# Patient Record
Sex: MALE | Race: WHITE | Employment: FULL TIME | URBAN - METROPOLITAN AREA
[De-identification: names, ages, dates, MRNs, and addresses within clinical notes are randomized per-mention and may not be internally consistent; named-entity substitution may affect disease eponyms.]

---

## 2017-05-16 ENCOUNTER — HOSPITAL ENCOUNTER (OUTPATIENT)
Dept: LAB | Age: 72
Discharge: HOME | End: 2017-05-16

## 2017-05-16 ENCOUNTER — OFFICE VISIT (OUTPATIENT)
Dept: DERMATOLOGY | Facility: AMBULATORY SURGERY CENTER | Age: 72
End: 2017-05-16

## 2017-05-16 VITALS
BODY MASS INDEX: 26.48 KG/M2 | TEMPERATURE: 98.2 F | SYSTOLIC BLOOD PRESSURE: 114 MMHG | RESPIRATION RATE: 18 BRPM | DIASTOLIC BLOOD PRESSURE: 70 MMHG | HEART RATE: 65 BPM | WEIGHT: 185 LBS | HEIGHT: 70 IN | OXYGEN SATURATION: 97 %

## 2017-05-16 DIAGNOSIS — Z85.828 HISTORY OF NONMELANOMA SKIN CANCER: ICD-10-CM

## 2017-05-16 DIAGNOSIS — D48.5 NEOPLASM OF UNCERTAIN BEHAVIOR OF SCALP: Primary | ICD-10-CM

## 2017-05-16 DIAGNOSIS — D22.9 MULTIPLE BENIGN NEVI: ICD-10-CM

## 2017-05-16 DIAGNOSIS — L90.5 SCAR CONDITION AND FIBROSIS OF SKIN: ICD-10-CM

## 2017-05-16 DIAGNOSIS — L82.1 SEBORRHEIC KERATOSES: ICD-10-CM

## 2017-05-16 DIAGNOSIS — D18.01 CHERRY ANGIOMA: ICD-10-CM

## 2017-05-16 DIAGNOSIS — L82.0 INFLAMED SEBORRHEIC KERATOSIS: ICD-10-CM

## 2017-05-16 DIAGNOSIS — L72.9 CYST OF SKIN: ICD-10-CM

## 2017-05-16 RX ORDER — SIMVASTATIN 40 MG/1
TABLET, FILM COATED ORAL
COMMUNITY
Start: 2017-04-16

## 2017-05-16 NOTE — PROGRESS NOTES
Mr. Kaylee Morales comes in for follow-up. He is 27-year-old male with a history of nonmelanoma skin cancer. He has noticed a rough spot on his scalp and a bump on his right collar bone. He is feeling well and in his usual state of health today. He has no pain, no current illnesses, no other skin concerns. His allergies medications medical and social history are reviewed by me today. I have reviewed the history, physical exam, assessment and plan by Lala Rosales NP and agree. He is awake alert gentleman who appears well. I examined his scalp face neck and upper chest. He has an inflamed seborrheic keratosis on the vertex scalp, anterior to this is a probably palpable concerning for new basal cell carcinoma. He has an inflamed seborrheic keratosis left neck and a 3 mm yellowish papule consistent with epidermal cyst on the right clavicle. He has a well-healed scar right nasal tip with sebaceous hyperplasia at the distal end, no evidence of recurrent skin cancer. Biopsy was performed on the scalp to confirm basal cell carcinoma, treatment would be with Mohs surgery due to location if diagnosis confirmed. Irritated seborrheic keratosis was treated with cryotherapy. Cyst can be treated with excision. Well-healed scar and sebaceous hyperplasia on the nose to be observed with his routine exams.

## 2017-05-16 NOTE — PROGRESS NOTES
Name: Rj Pryor       Age: 67 y.o. Date: 5/16/2017    Chief Complaint:   Chief Complaint   Patient presents with    Skin Exam       Subjective:    HPI  Mr. Rj Pryor is a 67 y.o. male who presents for a full skin exam.  The patient's last skin exam was on 10/11/2016 and the patient does have current complaints related to his skin. He is aware of a lesion on the scalp. He noticed a lesion/ bump on the scalp, right collar bone and left neck. Mr. Rj Pryor is feeling well and in his usual state of health today. The patient's pertinent skin history includes : multiple NMSC with largest -BCC of the right nasal sidewall and history of AK    ROS: Constitutional: Negative. Dermatological : positive for - skin lesion changes      Social History     Social History    Marital status:      Spouse name: N/A    Number of children: N/A    Years of education: N/A     Occupational History    Not on file. Social History Main Topics    Smoking status: Former Smoker    Smokeless tobacco: Not on file    Alcohol use Yes      Comment: \"socially\"    Drug use: Not on file    Sexual activity: Not on file     Other Topics Concern    Not on file     Social History Narrative       History reviewed. No pertinent family history. Past Medical History:   Diagnosis Date    Seizure disorder ([facility] [address]     Skin cancer 9/17/2012    basal cell carcinoma-right nose       Past Surgical History:   Procedure Laterality Date    HX OTHER SURGICAL      shoulder surgery       Current Outpatient Prescriptions   Medication Sig Dispense Refill    rosuvastatin (CRESTOR) 10 mg tablet Take 10 mg by mouth nightly.  carBAMazepine (TEGRETOL) 100 mg chewable tablet Take 100 mg by mouth four (4) times daily.         simvastatin (ZOCOR) 40 mg tablet       bupivacaine-EPINEPHrine (MARCAINE-EPINEPHRINE) 0.25 %-1:200,000 Soln For surgical procedure today only 3 mL 0       No Known Allergies      Objective:    Visit Vitals    /70 (BP 1 Location: Left arm, BP Patient Position: Sitting)    Pulse 65    Temp 98.2 °F (36.8 °C) (Oral)    Resp 18    Ht 5' 10\" (1.778 m)    Wt 185 lb (83.9 kg)    SpO2 97%    BMI 26.54 kg/m2       Bonnie Hughes is a 67 y.o. male who appears well and in no distress. He is awake, alert, and oriented. There is no preauricular, submandibular, or cervical lymphadenopathy. A skin examination was performed including his scalp, face (including eyelids), ears, neck, chest, back, abdomen, upper extremities (including digits/nails), lower extremities, breasts, buttocks; genital skin was not examined. There is a 7 x 6 mm pearly pink papule on the vertex scalp with telangiectasias, consistent with BCC. There is an inflamed seborrheic keratosis on the posterior vertex scalp and left neck. There is a 3 mm yellow mobile, non inflamed cystic structure at the head of the clavicle. There are multiple well-healed scars on the face and trunk without evidence of lesion recurrence. There scattered waxy macules and keratotic papules consistent with seborrheic keratoses, noninflamed. There are red and violaceous papules consistent with cherry angiomas. He has medium brown junctional nevi and pink intradermal nevi without concerning features. There are thickened toenails consistent with onychomycosis. Assessment/Plan:    1. Neoplasm of Uncertain Behavior, vertex scalp - r/o BCC. The differential diagnoses were discussed. A shave biopsy was advised to sample this lesion. The procedure was reviewed and verbal and written consent were obtained. The risks of pain, bleeding, infection, and scar were discussed. The patient is aware that this is a sample and is intended for diagnosis and not therapy of the skin lesion. I performed the procedure. The site was cleansed and anesthetized with 1% Lidocaine with Epinephrine 1:100,000.   A shave biopsy was performed to sample the lesion. Drysol was used for hemostasis. The wound was bandaged and care reviewed. The specimen was sent to pathology. I will contact the patient with the results and any further treatment that may be necessary. [facility]   OFFICE PROCEDURE PROGRESS NOTE   Chart reviewed for the following:   Boo MENDEZ, have reviewed the History, Physical and updated the Allergic reactions for [address] performed immediately prior to start of procedure:   Boo MENDEZ, have performed the following reviews on Elizabeth Mayo   prior to the start of the procedure:     * Patient was identified by name and date of birth   * Agreement on procedure being performed was verified   * Risks and Benefits explained to the patient   * Procedure site verified and marked as necessary   * Patient was positioned for comfort   * Consent was signed and verified     Time: [id number]  Date of procedure: 5/16/2017  Procedure performed by: Blaise Potter. Noris Lepe  Provider assisted by: lpn   Patient assisted by: self   How tolerated by patient: tolerated the procedure well with no complications   Comments: none    2. Inflamed seborrheic keratoses. The diagnosis and treatment with liquid nitrogen cryotherapy were reviewed. The risk or persistence or recurrence of the keratosis and the potential for pigment change at the treated site were reviewed. Verbal consent was obtained. I treated 2 lesions with cryotherapy and care was reviewed. 3. Seborrheic keratoses. The diagnosis was reviewed and the patient was reassured that no treatment is needed for these benign lesions. 4. Cherry angiomas. The diagnosis was reviewed and the patient was reassured that no treatment is needed for these benign lesions. 5.Normal nevi. The diagnosis of normal nevi was reviewed.   I discussed sun protection, sunscreen use, the warning signs of skin cancer, the need for self-skin examinations, and the need for regular practitioner exams every 6 months. The patient should follow up sooner as needed if new, changing, or symptomatic skin lesions arise. 6. Cyst of skin right clavicle. Excision with mohs appt as desired. 7. Personal history of skin cancer. I discussed sun protection, sunscreen use, the warning signs of skin cancer, the need for self-skin examinations, and the need for regular practitioner exams every 6 months. The patient should follow up sooner as needed if new, changing, or symptomatic skin lesions arise.

## 2017-05-16 NOTE — MR AVS SNAPSHOT
Visit Information Date & Time Provider Department Dept. Phone Encounter #  
 5/16/2017  8:30 AM Jefferygema Freddy, JUSTIN Deya 8057 [phone] [id number] Upcoming Health Maintenance Date Due DTaP/Tdap/Td series (1 - Tdap) 2/16/1965 FOBT Q 1 YEAR AGE 50-75 2/16/1994 ZOSTER VACCINE AGE 60> 2/16/2004 GLAUCOMA SCREENING Q2Y 2/16/2009 Pneumococcal 65+ Low/Medium Risk (1 of 2 - PCV13) 2/16/2009 MEDICARE YEARLY EXAM 2/16/2009 INFLUENZA AGE 9 TO ADULT 8/1/2017 Allergies as of 5/16/2017  Review Complete On: 5/16/2017 By: Tressa Abdul No Known Allergies Current Immunizations  Never Reviewed No immunizations on file. Not reviewed this visit You Were Diagnosed With   
  
 Codes Comments Neoplasm of uncertain behavior of scalp    -  Primary ICD-10-CM: D48.5 ICD-9-CM: 238.2 Vitals BP Pulse Temp Resp Height(growth percentile) Weight(growth percentile) 114/70 (BP 1 Location: Left arm, BP Patient Position: Sitting) 65 98.2 °F (36.8 °C) (Oral) 18 5' 10\" (1.778 m) 185 lb (83.9 kg) SpO2 BMI Smoking Status 97% 26.54 kg/m2 Former Smoker Vitals History BMI and BSA Data Body Mass Index Body Surface Area  
 26.54 kg/m 2 2.04 m 2 Preferred Pharmacy Pharmacy Name Phone [id number] [address] [phone] Your Updated Medication List  
  
   
This list is accurate as of: 5/16/17  8:59 AM.  Always use your most recent med list.  
  
  
  
  
 bupivacaine-EPINEPHrine 0.25 %-1:200,000 Soln Commonly known as:  Johnnye Candy For surgical procedure today only CRESTOR 10 mg tablet Generic drug:  rosuvastatin Take 10 mg by mouth nightly. simvastatin 40 mg tablet Commonly known as:  ZOCOR  
  
 TEGretol 100 mg chewable tablet Generic drug:  carBAMazepine Take 100 mg by mouth four (4) times daily. Patient Instructions Self Skin Exam and Sunscreens Early detection and treatment is essential in the treatment of all forms of skin cancer. If caught early, all forms of skin cancer are curable. In addition to your regular visits, you should perform a monthly skin examination. Over time, you become familiar with what is normally found on your skin and can identify new or suspicious spots. One of the screening tools you can use to assess your skin is to follow the ABCDEs: 
 
A= Asymmetry (One half is unlike the other half) B= Border (An irregular, scalloped or poorly defined edge) C= Color (Is varied from one area to another, has shades of tan, brown/ black,       white, red or blue) D= Diameter (Spots larger than 6mm or a pencil eraser) E= Evolving (New spots or one that is changing in size, shape, or color) A follow- up interval will be customized based on your history of skin cancer or level of skin damage and risk factors. In any case, if you notice a suspicious or new spot, an appointment should be arranged between regular visits. Everyone should use sunscreen and sun-safe practices, which is especially important for those with a personal or family history of skin cancer. Suggestions for this include: 1. Use daily moisturizers containing SPF 30 or higher. 2. Wear long sleeve clothing with UPF ratings and a broad-brimmed hat. 3. Apply sunscreen with SPF 30 or higher to all sun exposed areas if you are going to be in the sun. A broad spectrum UVA/ UVB sunscreen is best.  Dont forget to REAPPLY every two hours or more often if swimming or sweating! 4. Avoid outside activities during peak sun hours, especially in the summer (10am- 2pm). 5. DO NOT use tanning beds. Using sunscreen and sun-safe practices can help reduce the likelihood of developing skin cancer or additional skin cancers in those previously diagnosed. Introducing [address]! Radha Cleverly introduces [employer] patient portal. Now you can access parts of your medical record, email your doctor's office, and request medication refills online. 1. In your internet browser, go to [email or web address] 2. Click on the First Time User? Click Here link in the Sign In box. You will see the New Member Sign Up page. 3. Enter your [employer] Access Code exactly as it appears below. You will not need to use this code after youve completed the sign-up process. If you do not sign up before the expiration date, you must request a new code. · [employer] Access Code: [email or web address] Expires: 7/16/2017  9:41 AM 
 
4. Enter the last four digits of your Social Security Number (xxxx) and Date of Birth (mm/dd/yyyy) as indicated and click Submit. You will be taken to the next sign-up page. 5. Create a MegaHoot ID. This will be your [employer] login ID and cannot be changed, so think of one that is secure and easy to remember. 6. Create a [employer] password. You can change your password at any time. 7. Enter your Password Reset Question and Answer. This can be used at a later time if you forget your password. 8. Enter your e-mail address. You will receive e-mail notification when new information is available in [address] 9. Click Sign Up. You can now view and download portions of your medical record. 10. Click the Download Summary menu link to download a portable copy of your medical information. If you have questions, please visit the Frequently Asked Questions section of the [employer] website. Remember, [employer] is NOT to be used for urgent needs. For medical emergencies, dial 911. Now available from your iPhone and Android! Please provide this summary of care documentation to your next provider. Your primary care clinician is listed as Robyn Mccartney. If you have any questions after today's visit, please call [phone].

## 2017-05-22 ENCOUNTER — TELEPHONE (OUTPATIENT)
Dept: DERMATOLOGY | Facility: AMBULATORY SURGERY CENTER | Age: 72
End: 2017-05-22

## 2017-05-23 NOTE — PROGRESS NOTES
I left voicemail on his personal cell phone about the pathology result. He has Mohs surgery set up and encouraged him to keep that appointment. I asked him to call with questions about this result or his healing.

## 2017-06-20 ENCOUNTER — HOSPITAL ENCOUNTER (OUTPATIENT)
Dept: CT IMAGING | Age: 72
Discharge: HOME | End: 2017-06-20
Attending: ORTHOPAEDIC SURGERY
Payer: COMMERCIAL

## 2017-06-20 DIAGNOSIS — S42.291B: ICD-10-CM

## 2017-06-20 DIAGNOSIS — M25.511 RIGHT SHOULDER PAIN: ICD-10-CM

## 2017-06-20 PROCEDURE — 73200 CT UPPER EXTREMITY W/O DYE: CPT

## 2017-06-27 ENCOUNTER — OFFICE VISIT (OUTPATIENT)
Dept: DERMATOLOGY | Facility: AMBULATORY SURGERY CENTER | Age: 72
End: 2017-06-27

## 2017-06-27 ENCOUNTER — HOSPITAL ENCOUNTER (OUTPATIENT)
Dept: LAB | Age: 72
Discharge: HOME | End: 2017-06-27

## 2017-06-27 VITALS
SYSTOLIC BLOOD PRESSURE: 126 MMHG | DIASTOLIC BLOOD PRESSURE: 78 MMHG | HEIGHT: 70 IN | TEMPERATURE: 98.1 F | BODY MASS INDEX: 26.48 KG/M2 | HEART RATE: 69 BPM | WEIGHT: 185 LBS | OXYGEN SATURATION: 98 %

## 2017-06-27 DIAGNOSIS — C44.41 BASAL CELL CARCINOMA OF SCALP: Primary | ICD-10-CM

## 2017-06-27 DIAGNOSIS — C44.41 BASAL CELL CARCINOMA, SCALP/NECK: ICD-10-CM

## 2017-06-27 RX ORDER — BUPIVACAINE HYDROCHLORIDE AND EPINEPHRINE 2.5; 5 MG/ML; UG/ML
INJECTION, SOLUTION INFILTRATION; PERINEURAL
Qty: 1.5 ML | Refills: 0 | Status: ON HOLD
Start: 2017-06-27 | End: 2021-06-01

## 2017-06-27 RX ORDER — LIDOCAINE HYDROCHLORIDE AND EPINEPHRINE 10; 10 MG/ML; UG/ML
3 INJECTION, SOLUTION INFILTRATION; PERINEURAL ONCE
Qty: 3 ML | Refills: 0
Start: 2017-06-27 | End: 2017-06-27

## 2017-06-27 NOTE — MR AVS SNAPSHOT
Visit Information Date & Time Provider Department Dept. Phone Encounter #  
 6/27/2017  8:15 AM MD Deya Fountain 8057 [phone] Your Appointments 7/12/2017  8:00 AM  
SURGICAL FOLLOW UP with MD Deya Fountain 8057 [facility]) Appt Note: Suture removal  
 [facility] [address] [facility] [id number] [place] [address] [address] [facility] [address] Upcoming Health Maintenance Date Due DTaP/Tdap/Td series (1 - Tdap) 2/16/1965 FOBT Q 1 YEAR AGE 50-75 2/16/1994 ZOSTER VACCINE AGE 60> 2/16/2004 GLAUCOMA SCREENING Q2Y 2/16/2009 Pneumococcal 65+ Low/Medium Risk (1 of 2 - PCV13) 2/16/2009 MEDICARE YEARLY EXAM 2/16/2009 INFLUENZA AGE 9 TO ADULT 8/1/2017 Allergies as of 6/27/2017  Review Complete On: 6/27/2017 By: Magdaleno Montemayor MD  
 No Known Allergies Current Immunizations  Never Reviewed No immunizations on file. Not reviewed this visit You Were Diagnosed With   
  
 Codes Comments Basal cell carcinoma of scalp    -  Primary ICD-10-CM: C44.41 
ICD-9-CM: 173.41 Basal cell carcinoma, scalp/neck     ICD-10-CM: C44.41 
ICD-9-CM: 173.41 Vitals BP Pulse Temp Height(growth percentile) Weight(growth percentile) SpO2  
 126/78 (BP 1 Location: Left arm, BP Patient Position: Sitting) 69 98.1 °F (36.7 °C) (Oral) 5' 10\" (1.778 m) 185 lb (83.9 kg) 98% BMI Smoking Status 26.54 kg/m2 Former Smoker BMI and BSA Data Body Mass Index Body Surface Area  
 26.54 kg/m 2 2.04 m 2 Preferred Pharmacy Pharmacy Name Phone [id number] [place] [facility] [address] [facility] [address] [phone] Your Updated Medication List  
  
   
This list is accurate as of: 6/27/17  9:31 AM.  Always use your most recent med list.  
  
  
  
  
 bupivacaine-EPINEPHrine 0.25 %-1:200,000 Soln Commonly known as:  Negar Sanchez For surgical procedure today only CRESTOR 10 mg tablet Generic drug:  rosuvastatin Take 10 mg by mouth nightly. simvastatin 40 mg tablet Commonly known as:  ZOCOR  
  
 TEGretol 100 mg chewable tablet Generic drug:  carBAMazepine Take 100 mg by mouth four (4) times daily. Patient Instructions WOUND CARE INSTRUCTIONS 1. Keep the dressing clean and dry and do not remove for 48 hours. 2. Then change the dressing once a day as follows: 
a. Wash hands before and after each dressing change. b. Remove dressing and wash site gently with mild soap and water, rinse, and pat dry. 
c. Apply an ointment (Bacitracin, Polysporin, Neosporin, Petroleum jelly or Aquaphor). d. Apply a non-stick (Telfa) dressing or Band-Aid to cover the wound. Remove pressure bandage Thursday, then wash gently and apply Vaseline and a band-aid to site daily for 1 week. 3. Watch for: BLEEDING: A small amount of drainage may occur. If bleeding occurs, elevate and rest the surgery site. Apply gauze and steady pressure for 15 minutes. If bleeding continues, call this office. INFECTION: Signs of infection include increased redness, pain, warmth, drainage of pus, and fever. If this occurs, call this office. 4. Special Instructions (follow any that are checked): ·  You have stitches that need to be removed in 0 days ·  Avoid bending at the waist and heavy lifting for two days. ·  Sleep with your head elevated for the next two nights. ·  Rest the surgery site and keep it elevated as much as possible for two days. ·  You may apply an ice-pack for 10-15 minutes every waking hour for the rest of the day. ·  Eat a soft diet and avoid hot food and hot drinks for the rest of the day. ·  Other instructions: Follow up as  directed Take Tylenol for pain as needed. Once the site is healed with no remaining bandages or open areas, protect your surgical site and scar from the sun, as this area will be more sensitive. Use a broad spectrum sunscreen SPF 30 or higher daily, and a chemical free product (one containing zinc oxide or titanium dioxide) is a good choice if the area is sensitive. You may begin to gently massage the surgical site in 2-3 weeks, rubbing in a circular motion along the scar. This can help reduce swelling and thickness of a scar. A scar cream may be used beginnning 1 month after the surgery. If you have any questions or concerns, please call our office Monday through Friday at [phone]. Introducing [facility]! Joshua Love introduces [employer] patient portal. Now you can access parts of your medical record, email your doctor's office, and request medication refills online. 1. In your internet browser, go to [email or web address] [email or web address] 2. Click on the First Time User? Click Here link in the Sign In box. You will see the New Member Sign Up page. 3. Enter your [employer] Access Code exactly as it appears below. You will not need to use this code after youve completed the sign-up process. If you do not sign up before the expiration date, you must request a new code. · [employer] Access Code: [email or web address] Expires: 7/16/2017  9:41 AM 
 
4. Enter the last four digits of your Social Security Number (xxxx) and Date of Birth (mm/dd/yyyy) as indicated and click Submit. You will be taken to the next sign-up page. 5. Create a Loved[employer]lat ID. This will be your [employer] login ID and cannot be changed, so think of one that is secure and easy to remember. 6. Create a Loved[employer] password. You can change your password at any time. 7. Enter your Password Reset Question and Answer. This can be used at a later time if you forget your password. 8. Enter your e-mail address.  You will receive e-mail notification when new information is available in [employer]. 9. Click Sign Up. You can now view and download portions of your medical record. 10. Click the Download Summary menu link to download a portable copy of your medical information. If you have questions, please visit the Frequently Asked Questions section of the [employer] website. Remember, [employer] is NOT to be used for urgent needs. For medical emergencies, dial 911. Now available from your iPhone and Android! Please provide this summary of care documentation to your next provider. Your primary care clinician is listed as Alex Merida. If you have any questions after today's visit, please call [phone].

## 2017-06-27 NOTE — PROGRESS NOTES
This note is written by Servando Mcburney, as dictated by Juan Francisco Porras. Filomena Bhatti MD.    CC: Basal cell carcinoma on the vertex scalp    History of present illness:     Rosendo Lunsford is a 67 y.o. male referred by Carolyn Lynch, [address]. He has a biopsy-proven infiltrative basal cell carcinoma on the vertex scalp. This is a new basal cell carcinoma present for more than six months described as a rough bump with no prior treatment. Biopsy confirmed the diagnosis of basal cell carcinoma, and I reviewed the written pathology report. He is feeling well and in his usual state of health today. He has no pain, no current illnesses, no other skin concerns. His allergies, medications, medical, and social history are reviewed by me today. Exam:     He is an awake, alert, and oriented 67 y.o. male who appears well and in no distress. There is no preauricular, submandibular, or cervical lymphadenopathy. I examined his scalp. He has a 11 x 8 mm pearly papule on his vertex scalp. He confirms location. He also has a 3 x 3 mm new shiny papule on his left vertex scalp. Assessment/plan:    1. Basal cell carcinoma, vertex scalp. I discussed the diagnosis of basal cell carcinoma and summarized the pathology report. Mohs surgery is indicated by site, size, and histology. The procedure was discussed, verbal and written consent were obtained. I performed the procedure. One stage was required to reach a tumor free plane. The surgical defect was managed with intermediate repair. There were no complications. He will return in 2 weeks for suture removal and follow up as needed as the site heals. Indications, risks, and options were discussed with Rosendo Lunsford preoperatively. Risks including, but not limited to: pain, bleeding, infection, tumor recurrence, scarring and damage to motor and/or sensory nerves, were discussed. Rosendo Lunsford chose Mohs surgery. Rosendo Lunsford was an acceptable surgery candidate.     Roberta Butt Victor Hugo Lantigua was placed in the appropriate position on the operating table in the Mohs surgery procedure room. The area was prepped and draped in the standard manner. Gentian violet was used to outline the clinical margins of the tumor. Local anesthesia was then obtained. The grossly visible tumor was then removed, an underlying layer was excised and mapped according to the Mohs technique, and the individual specimens examined microscopically. The process was repeated until microscopic examination of the tissue specimens confirmed a tumor-free plane. Hemostasis was obtained with electrosurgery and pressure. The wound was covered between stages with moist saline gauze. The wound management options of second intent healing, layered closure, local flap, and/or full thickness skin graft were discussed. Tonja Cook understands the aims, risks, alternatives, and possible complications and elects to proceed with an intermediate layered closure. Wound margins were made vertical, edges undermined in the fascial plane, standing cones corrected at both poles followed by layered closure. The wound was closed with buried 4-0 polysorb suture in the subcutis to reduce tension on the skin edges, and skin edges were approximated with 4-0 nylon suture in the dermis to reduce tension on the epidermis. The final closure length was 32 mm. Vaseline was applied to the wound. Wound care instructions (written and verbal) and a follow up appointment were given to Tonja Cook before discharge. Tonja Cook was discharged in good condition. 2. Neoplasm of uncertain behavior concerning for basal cell carcinoma, left vertex scalp. Curettage was advised to address this small lesion with malignant destruction. The procedure was reviewed and verbal and written consent were obtained. The risks of pain, bleeding, infection, and scar and recurrence were discussed. I performed the procedure.   The site was cleansed and anesthetized with 1% lidocaine with epinephrine 1:100,000. Curettage of the base and 2 mm margin was performed to remove the lesion in its clinical entirety, resulting in a post curettage defect size of 5 x 5 mm. Drysol was used for hemostasis. The wound was bandaged and care reviewed. The specimen was sent to pathology. I will contact the patient with the results and any further treatment that may be necessary. 3. History of basal cell carcinomas. I discussed the diagnosis and recommend routine examinations with Vinod Mohr DNP for surveillance. The documentation recorded by the scribe accurately reflects the service I personally performed and the decisions made by me. [facility]   OFFICE PROCEDURE PROGRESS NOTE     Chart reviewed for the following:     Fermin Tavares MD, have reviewed the History, Physical and updated the Allergic reactions for [address] [place] [address] performed immediately prior to start of procedure:     Fermin Tavares MD, have performed the following reviews on [facility] prior to the start of the procedure:     * Patient was identified by name and date of birth   * Agreement on procedure being performed was verified   * Risks and Benefits explained to the patient   * Procedure site verified and marked as necessary   * Patient was positioned for comfort   * Consent was signed and verified     Time: 8:40 AM   Date of procedure: 6/27/2017  Procedure performed by: Nancy Tavares MD   Provider assisted by: LPN   Patient assisted by: self   How tolerated by patient: tolerated the procedure well with no complications   Comments: none

## 2017-06-27 NOTE — PATIENT INSTRUCTIONS
WOUND CARE INSTRUCTIONS    1. Keep the dressing clean and dry and do not remove for 48 hours. 2. Then change the dressing once a day as follows:  a. Wash hands before and after each dressing change. b. Remove dressing and wash site gently with mild soap and water, rinse, and pat dry.  c. Apply an ointment (Bacitracin, Polysporin, Neosporin, Petroleum jelly or Aquaphor). d. Apply a non-stick (Telfa) dressing or Band-Aid to cover the wound. Remove pressure bandage Thursday, then wash gently and apply Vaseline and a band-aid to site daily for 1 week. 3. Watch for:  BLEEDING: A small amount of drainage may occur. If bleeding occurs, elevate and rest the surgery site. Apply gauze and steady pressure for 15 minutes. If bleeding continues, call this office. INFECTION: Signs of infection include increased redness, pain, warmth, drainage of pus, and fever. If this occurs, call this office. 4. Special Instructions (follow any that are checked):  · [] You have stitches that need to be removed in 0 days  · [x] Avoid bending at the waist and heavy lifting for two days. · [x] Sleep with your head elevated for the next two nights. · [x] Rest the surgery site and keep it elevated as much as possible for two days. · [x] You may apply an ice-pack for 10-15 minutes every waking hour for the rest of the day. · [] Eat a soft diet and avoid hot food and hot drinks for the rest of the day. · [] Other instructions: Follow up as  directed  Take Tylenol for pain as needed. Once the site is healed with no remaining bandages or open areas, protect your surgical site and scar from the sun, as this area will be more sensitive. Use a broad spectrum sunscreen SPF 30 or higher daily, and a chemical free product (one containing zinc oxide or titanium dioxide) is a good choice if the area is sensitive. You may begin to gently massage the surgical site in 2-3 weeks, rubbing in a circular motion along the scar.  This can help reduce swelling and thickness of a scar. A scar cream may be used beginnning 1 month after the surgery. If you have any questions or concerns, please call our office Monday through Friday at [phone].

## 2017-06-29 NOTE — PROGRESS NOTES
I called his cell # on 6/29, I left a message with report of [address] that was curetted after biopsy. Call with any questions.

## 2017-07-12 ENCOUNTER — OFFICE VISIT (OUTPATIENT)
Dept: DERMATOLOGY | Facility: AMBULATORY SURGERY CENTER | Age: 72
End: 2017-07-12

## 2017-07-12 DIAGNOSIS — Z48.3 AFTERCARE FOLLOWING SURGERY FOR NEOPLASM: ICD-10-CM

## 2017-07-12 DIAGNOSIS — Z48.02 ENCOUNTER FOR REMOVAL OF SUTURES: Primary | ICD-10-CM

## 2017-08-14 ENCOUNTER — ANESTHESIA (OUTPATIENT)
Dept: ENDOSCOPY | Age: 72
End: 2017-08-14
Payer: COMMERCIAL

## 2017-08-14 ENCOUNTER — ANESTHESIA EVENT (OUTPATIENT)
Dept: ENDOSCOPY | Age: 72
End: 2017-08-14
Payer: COMMERCIAL

## 2017-08-14 ENCOUNTER — HOSPITAL ENCOUNTER (OUTPATIENT)
Age: 72
Setting detail: OUTPATIENT SURGERY
Discharge: HOME | End: 2017-08-14
Attending: SPECIALIST | Admitting: SPECIALIST
Payer: COMMERCIAL

## 2017-08-14 VITALS
BODY MASS INDEX: 27.2 KG/M2 | HEART RATE: 55 BPM | SYSTOLIC BLOOD PRESSURE: 113 MMHG | TEMPERATURE: 98.3 F | WEIGHT: 190 LBS | DIASTOLIC BLOOD PRESSURE: 71 MMHG | RESPIRATION RATE: 11 BRPM | OXYGEN SATURATION: 96 % | HEIGHT: 70 IN

## 2017-08-14 PROCEDURE — 77030027957 HC TBNG IRR ENDOGTR BUSS -B: Performed by: SPECIALIST

## 2017-08-14 PROCEDURE — 76060000032 HC ANESTHESIA 0.5 TO 1 HR: Performed by: SPECIALIST

## 2017-08-14 PROCEDURE — 76040000007: Performed by: SPECIALIST

## 2017-08-14 PROCEDURE — 77030013992 HC SNR POLYP ENDOSC BSC -B: Performed by: SPECIALIST

## 2017-08-14 PROCEDURE — 88305 TISSUE EXAM BY PATHOLOGIST: CPT | Performed by: SPECIALIST

## 2017-08-14 PROCEDURE — 77030009426 HC FCPS BIOP ENDOSC BSC -B: Performed by: SPECIALIST

## 2017-08-14 PROCEDURE — 74011250636 HC RX REV CODE- 250/636

## 2017-08-14 PROCEDURE — 77030011640 HC PAD GRND REM COVD -A: Performed by: SPECIALIST

## 2017-08-14 RX ORDER — ATROPINE SULFATE 0.1 MG/ML
0.5 INJECTION INTRAVENOUS
Status: DISCONTINUED | OUTPATIENT
Start: 2017-08-14 | End: 2017-08-14 | Stop reason: HOSPADM

## 2017-08-14 RX ORDER — LORAZEPAM 2 MG/ML
2 INJECTION INTRAMUSCULAR AS NEEDED
Status: DISCONTINUED | OUTPATIENT
Start: 2017-08-14 | End: 2017-08-14 | Stop reason: HOSPADM

## 2017-08-14 RX ORDER — MIDAZOLAM HYDROCHLORIDE 1 MG/ML
.25-1 INJECTION, SOLUTION INTRAMUSCULAR; INTRAVENOUS
Status: DISCONTINUED | OUTPATIENT
Start: 2017-08-14 | End: 2017-08-14 | Stop reason: HOSPADM

## 2017-08-14 RX ORDER — SODIUM CHLORIDE 0.9 % (FLUSH) 0.9 %
5-10 SYRINGE (ML) INJECTION AS NEEDED
Status: DISCONTINUED | OUTPATIENT
Start: 2017-08-14 | End: 2017-08-14 | Stop reason: HOSPADM

## 2017-08-14 RX ORDER — SODIUM CHLORIDE 0.9 % (FLUSH) 0.9 %
5-10 SYRINGE (ML) INJECTION EVERY 8 HOURS
Status: DISCONTINUED | OUTPATIENT
Start: 2017-08-14 | End: 2017-08-14 | Stop reason: HOSPADM

## 2017-08-14 RX ORDER — FENTANYL CITRATE 50 UG/ML
200 INJECTION, SOLUTION INTRAMUSCULAR; INTRAVENOUS
Status: DISCONTINUED | OUTPATIENT
Start: 2017-08-14 | End: 2017-08-14 | Stop reason: HOSPADM

## 2017-08-14 RX ORDER — EPINEPHRINE 0.1 MG/ML
1 INJECTION INTRACARDIAC; INTRAVENOUS
Status: DISCONTINUED | OUTPATIENT
Start: 2017-08-14 | End: 2017-08-14 | Stop reason: HOSPADM

## 2017-08-14 RX ORDER — SODIUM CHLORIDE 9 MG/ML
50 INJECTION, SOLUTION INTRAVENOUS CONTINUOUS
Status: DISCONTINUED | OUTPATIENT
Start: 2017-08-14 | End: 2017-08-14 | Stop reason: HOSPADM

## 2017-08-14 RX ORDER — NALOXONE HYDROCHLORIDE 0.4 MG/ML
0.4 INJECTION, SOLUTION INTRAMUSCULAR; INTRAVENOUS; SUBCUTANEOUS
Status: DISCONTINUED | OUTPATIENT
Start: 2017-08-14 | End: 2017-08-14 | Stop reason: HOSPADM

## 2017-08-14 RX ORDER — DEXTROMETHORPHAN/PSEUDOEPHED 2.5-7.5/.8
1.2 DROPS ORAL
Status: DISCONTINUED | OUTPATIENT
Start: 2017-08-14 | End: 2017-08-14 | Stop reason: HOSPADM

## 2017-08-14 RX ORDER — FLUMAZENIL 0.1 MG/ML
0.2 INJECTION INTRAVENOUS
Status: DISCONTINUED | OUTPATIENT
Start: 2017-08-14 | End: 2017-08-14 | Stop reason: HOSPADM

## 2017-08-14 RX ORDER — PROPOFOL 10 MG/ML
INJECTION, EMULSION INTRAVENOUS AS NEEDED
Status: DISCONTINUED | OUTPATIENT
Start: 2017-08-14 | End: 2017-08-14 | Stop reason: HOSPADM

## 2017-08-14 RX ORDER — SODIUM CHLORIDE 9 MG/ML
INJECTION, SOLUTION INTRAVENOUS
Status: DISCONTINUED | OUTPATIENT
Start: 2017-08-14 | End: 2017-08-14 | Stop reason: HOSPADM

## 2017-08-14 RX ADMIN — PROPOFOL 50 MG: 10 INJECTION, EMULSION INTRAVENOUS at 10:31

## 2017-08-14 RX ADMIN — PROPOFOL 50 MG: 10 INJECTION, EMULSION INTRAVENOUS at 10:22

## 2017-08-14 RX ADMIN — PROPOFOL 50 MG: 10 INJECTION, EMULSION INTRAVENOUS at 10:15

## 2017-08-14 RX ADMIN — PROPOFOL 50 MG: 10 INJECTION, EMULSION INTRAVENOUS at 10:35

## 2017-08-14 RX ADMIN — PROPOFOL 50 MG: 10 INJECTION, EMULSION INTRAVENOUS at 10:40

## 2017-08-14 RX ADMIN — PROPOFOL 50 MG: 10 INJECTION, EMULSION INTRAVENOUS at 10:17

## 2017-08-14 RX ADMIN — PROPOFOL 100 MG: 10 INJECTION, EMULSION INTRAVENOUS at 10:12

## 2017-08-14 RX ADMIN — SODIUM CHLORIDE: 9 INJECTION, SOLUTION INTRAVENOUS at 09:47

## 2017-08-14 RX ADMIN — PROPOFOL 50 MG: 10 INJECTION, EMULSION INTRAVENOUS at 10:26

## 2017-08-14 RX ADMIN — PROPOFOL 50 MG: 10 INJECTION, EMULSION INTRAVENOUS at 10:20

## 2017-08-14 NOTE — ROUTINE PROCESS
Nelida Azam  1945  [id number]    Situation:  Verbal report received from: Laura Linhas  Procedure: Procedure(s):  COLONOSCOPY  ENDOSCOPIC POLYPECTOMY    Background:    Preoperative diagnosis: PERSONAL HX OF COLONIC POLYPS  Postoperative diagnosis: Severe Diverticulosis  Colon Polyps    :  Dr. Patricia Macario  Assistant(s): Endoscopy Technician-1: Maranda Vela  Endoscopy RN-1: Jamie Shane RN    Specimens:   ID Type Source Tests Collected by Time Destination   1 : Transverse Colon Polyps Preservative   Leonidas Ortega MD 8/14/2017 1032 Pathology   2 : Descending Colon Polyp Preservative   Leonidas Ortega MD 8/14/2017 1038 Pathology     H. Pylori  no    Assessment:  Intra-procedure medications     Anesthesia gave intra-procedure sedation and medications, see anesthesia flow sheet yes    Intravenous fluids: NS@ KVO     Vital signs stable     Abdominal assessment: round and soft     Recommendation:  Discharge patient per MD order.     Family or Friend   Permission to share finding with family or friend yes

## 2017-08-14 NOTE — ANESTHESIA PREPROCEDURE EVALUATION
Anesthetic History   No history of anesthetic complications            Review of Systems / Medical History  Patient summary reviewed, nursing notes reviewed and pertinent labs reviewed    Pulmonary  Within defined limits                 Neuro/Psych   Within defined limits  seizures         Cardiovascular  Within defined limits                     GI/Hepatic/Renal  Within defined limits              Endo/Other  Within defined limits           Other Findings              Physical Exam    Airway  Mallampati: II  TM Distance: > 6 cm  Neck ROM: normal range of motion   Mouth opening: Normal     Cardiovascular  Regular rate and rhythm,  S1 and S2 normal,  no murmur, click, rub, or gallop             Dental    Dentition: Caps/crowns     Pulmonary  Breath sounds clear to auscultation               Abdominal  GI exam deferred       Other Findings            Anesthetic Plan    ASA: 2  Anesthesia type: MAC          Induction: Intravenous  Anesthetic plan and risks discussed with: Patient

## 2017-08-14 NOTE — DISCHARGE INSTRUCTIONS
Chris Sood  [id number]  1945    COLON DISCHARGE INSTRUCTIONS  Discomfort:  Redness at IV site- apply warm compress to area; if redness or soreness persist- contact your physician  There may be a slight amount of blood passed from the rectum  Gaseous discomfort- walking, belching will help relieve any discomfort  You may not operate a vehicle for 12 hours  You may not engage in an occupation involving machinery or appliances for rest of today  You may not drink alcoholic beverages for at least 12 hours  Avoid making any critical decisions for at least 24 hour  DIET:   Regular diet. - however -  remember your colon is empty and a heavy meal will produce gas. Avoid these foods:  vegetables, fried / greasy foods, carbonated drinks for today. ACTIVITY:  You may resume your normal daily activities it is recommended that you spend the remainder of the day resting -  avoid any strenuous activity. CALL M.D. ANY SIGN OF:  Increasing pain, nausea, vomiting  Abdominal distension (swelling)  New increased bleeding (oral or rectal)  Fever (chills)  Pain in chest area  Bloody discharge from nose or mouth  Shortness of breath    You may not  take any Advil, Aspirin, Ibuprofen, Motrin, Aleve, Goodys, or any similar pain or arthritis products for 10 days, ONLY  Tylenol as needed for pain.       Follow-up Instructions:   Call Dr. Karen Archuleta  Results of procedure / biopsy in 10-14days   Office telephone for problems or questions [phone]    Recommendation for next colonscopy in 3 years  If < 10 years, reason:  above average risk patient

## 2017-08-14 NOTE — IP AVS SNAPSHOT
[address] 
[phone] Patient: Robbie Yin MRN: [id number] WSM:[birth year]50[birth year] You are allergic to the following No active allergies Recent Documentation Height Weight BMI Smoking Status 1.778 m 86.2 kg 27.26 kg/m2 Former Smoker Emergency Contacts Name Discharge Info Relation Home Work Mobile Kassandra 66 CAREGIVER [3] Spouse [3] [phone] About your hospitalization You were admitted on:  August 14, 2017 You last received care in the:  [facility] ENDOSCOPY You were discharged on:  August 14, 2017 Unit phone number:  [phone] Why you were hospitalized Your primary diagnosis was:  Not on File Providers Seen During Your Hospitalizations Provider Role Specialty Primary office phone Vero Sunshine MD Attending Provider Gastroenterology [phone] Your Primary Care Physician (PCP) Primary Care Physician Office Phone Office Fax [place] [address] [phone] Follow-up Information None Current Discharge Medication List  
  
CONTINUE these medications which have NOT CHANGED Dose & Instructions Dispensing Information Comments Morning Noon Evening Bedtime * bupivacaine-EPINEPHrine 0.25 %-1:200,000 Soln Commonly known as:  Oscar Lopez Your last dose was: Your next dose is: For surgical procedure today only Quantity:  3 mL Refills:  0  
     
   
   
   
  
 * bupivacaine-EPINEPHrine 0.25 %-1:200,000 Soln Commonly known as:  Oscar Lopez Your last dose was: Your next dose is:    
   
   
 Used for [facility] surgery Quantity:  1.5 mL Refills:  0  
     
   
   
   
  
 simvastatin 40 mg tablet Commonly known as:  ZOCOR Your last dose was: Your next dose is:    
   
   
  Refills:  0 TEGretol 100 mg chewable tablet Generic drug:  carBAMazepine Your last dose was: Your next dose is:    
   
   
 Dose:  200 mg Take 200 mg by mouth daily. Refills:  0  
     
   
   
   
  
 * Notice: This list has 2 medication(s) that are the same as other medications prescribed for you. Read the directions carefully, and ask your doctor or other care provider to review them with you. Discharge Instructions Jonathan Bliss 
[id number] 
1945 COLON DISCHARGE INSTRUCTIONS Discomfort: 
Redness at IV site- apply warm compress to area; if redness or soreness persist- contact your physician There may be a slight amount of blood passed from the rectum Gaseous discomfort- walking, belching will help relieve any discomfort You may not operate a vehicle for 12 hours You may not engage in an occupation involving machinery or appliances for rest of today You may not drink alcoholic beverages for at least 12 hours Avoid making any critical decisions for at least 24 hour DIET: 
 Regular diet.  however -  remember your colon is empty and a heavy meal will produce gas. Avoid these foods:  vegetables, fried / greasy foods, carbonated drinks for today. ACTIVITY: 
You may resume your normal daily activities it is recommended that you spend the remainder of the day resting -  avoid any strenuous activity. CALL M.D. ANY SIGN OF: Increasing pain, nausea, vomiting Abdominal distension (swelling) New increased bleeding (oral or rectal) Fever (chills) Pain in chest area Bloody discharge from nose or mouth Shortness of breath You may not  take any Advil, Aspirin, Ibuprofen, Motrin, Aleve, Goodys, or any similar pain or arthritis products for 10 days, ONLY  Tylenol as needed for pain. Follow-up Instructions: 
 Call Dr. Bobby Torres Results of procedure / biopsy in 10-14days Office telephone for problems or questions [phone] Recommendation for next colonscopy in 3 years If < 10 years, reason:  above average risk patient Discharge Orders None Introducing [facility]! Alana Andre introduces [employer] patient portal. Now you can access parts of your medical record, email your doctor's office, and request medication refills online. 1. In your internet browser, go to [email or web address] 2. Click on the First Time User? Click Here link in the Sign In box. You will see the New Member Sign Up page. 3. Enter your [employer] Access Code exactly as it appears below. You will not need to use this code after youve completed the sign-up process. If you do not sign up before the expiration date, you must request a new code. · [employer] Access Code: [email or web address] Expires: 10/14/2017  9:07 AM 
 
4. Enter the last four digits of your Social Security Number (xxxx) and Date of Birth (mm/dd/yyyy) as indicated and click Submit. You will be taken to the next sign-up page. 5. Create a [employer] ID. This will be your [employer] login ID and cannot be changed, so think of one that is secure and easy to remember. 6. Create a [employer] password. You can change your password at any time. 7. Enter your Password Reset Question and Answer. This can be used at a later time if you forget your password. 8. Enter your e-mail address. You will receive e-mail notification when new information is available in [address] 9. Click Sign Up. You can now view and download portions of your medical record. 10. Click the Download Summary menu link to download a portable copy of your medical information. If you have questions, please visit the Frequently Asked Questions section of the [employer] website. Remember, [employer] is NOT to be used for urgent needs. For medical emergencies, dial 911. Now available from your iPhone and Android! General Information Please provide this summary of care documentation to your next provider. Patient Signature:  ____________________________________________________________ Date:  ____________________________________________________________  
  
Melony Oka Provider Signature:  ____________________________________________________________ Date:  ____________________________________________________________

## 2017-08-14 NOTE — ANESTHESIA POSTPROCEDURE EVALUATION
Post-Anesthesia Evaluation and Assessment    Patient: Judy Nichols MRN: [id number]  SSN: [id number]    YOB: 1945  Age: 67 y.o. Sex: male       Cardiovascular Function/Vital Signs  Visit Vitals    BP 98/65    Pulse (!) 54    Temp 36.8 °C (98.3 °F)    Resp 14    Ht 5' 10\" (1.778 m)    Wt 86.2 kg (190 lb)    SpO2 92%    BMI 27.26 kg/m2       Patient is status post MAC anesthesia for Procedure(s):  COLONOSCOPY  ENDOSCOPIC POLYPECTOMY. Nausea/Vomiting: None    Postoperative hydration reviewed and adequate. Pain:  Pain Scale 1: Visual (08/14/17 1055)  Pain Intensity 1: 0 (08/14/17 1055)   Managed    Neurological Status: At baseline    Mental Status and Level of Consciousness: Arousable    Pulmonary Status:   O2 Device: Room air (08/14/17 1055)   Adequate oxygenation and airway patent    Complications related to anesthesia: None    Post-anesthesia assessment completed.  No concerns    Signed By: Adelaide Brandt MD     August 14, 2017

## 2017-08-14 NOTE — H&P
Pre-endoscopy H and P for Colonoscopy    The patient was seen and examined. Date of last colonoscopy: 2012, Polyps  Yes      The airway was assessed and documented. The problem list, past medical history, and medications were reviewed. Patient Active Problem List   Diagnosis Code    History of basal cell cancer Z85.828     Social History     Social History    Marital status:      Spouse name: N/A    Number of children: N/A    Years of education: N/A     Occupational History    Not on file. Social History Main Topics    Smoking status: Former Smoker    Smokeless tobacco: Not on file    Alcohol use Yes      Comment: \"socially\"    Drug use: Not on file    Sexual activity: Not on file     Other Topics Concern    Not on file     Social History Narrative     Past Medical History:   Diagnosis Date    Seizure disorder [facility]     Shoulder fracture, right 2017    Skin cancer 9/17/2012    basal cell carcinoma-right nose     The patient has a family history of na    Prior to Admission Medications   Prescriptions Last Dose Informant Patient Reported? Taking? bupivacaine-EPINEPHrine (MARCAINE-EPINEPHRINE) 0.25 %-1:200,000 Soln   No No   Sig: For surgical procedure today only   bupivacaine-EPINEPHrine (MARCAINE-EPINEPHRINE) 0.25 %-1:200,000 soln   No No   Sig: Used for MOH's surgery   carBAMazepine (TEGRETOL) 100 mg chewable tablet 8/13/2017 at Unknown time  Yes Yes   Sig: Take 200 mg by mouth daily. simvastatin (ZOCOR) 40 mg tablet 8/13/2017 at Unknown time  Yes Yes      Facility-Administered Medications: None         The review of systems is:  negative for shortness of breath or chest pain      The heart, lungs and mental status were satisfactory for the administration of MAC sedation and for the procedure. Mallampati score: See Anesthesia. I discussed with the patient the objectives, risks, consequences and alternatives to the procedure.       Plan: Endoscopic procedure with MAC sedation.     Deisy Tang MD  8/14/2017  10:13 AM

## 2017-08-16 NOTE — PROCEDURES
Colonoscopy Procedure Note    Indications:   Personal history of colon polyps (screening only)  Referring Physician: Parker Navas MD   Anesthesia/Sedation:MAC  Endoscopist:  Dr. Gertrude Celeste  Assistant:  Endoscopy Technician-1: Isabelle Bloom  Endoscopy RN-1: Jennifer Moreno RN    Preoperative diagnosis: PERSONAL HX OF COLONIC POLYPS    Postoperative diagnosis: Severe Diverticulosis  Colon Polyps      Procedure in Detail:  Informed consent was obtained for the procedure, including sedation. Risks of perforation, hemorrhage, adverse drug reaction, and aspiration were discussed. The patient was placed in the left lateral decubitus position. Based on the pre-procedure assessment, including review of the patient's medical history, medications, allergies, and review of systems, he had been deemed to be an appropriate candidate for moderate sedation; he was therefore sedated with the medications listed above. The patient was monitored continuously with ECG tracing, pulse oximetry, blood pressure monitoring, and direct observations. A rectal examination was performed. The [id number] was inserted into the rectum and advanced under direct vision to the terminal ileum. The quality of the colonic preparation was excellent. A careful inspection was made as the colonoscope was withdrawn, including a retroflexed view of the rectum; findings and interventions are described below. Findings:   Rectum: normal  Sigmoid: severe diverticulosis; Descending Colon: 4 mm polyp removed with hot snare  Transverse Colon:  Two -  4 mm polyps removed with hot snare  Ascending Colon: normal  Cecum: normal  Terminal Ileum: normal    Specimens:     see above    EBL: None    Complications: None; patient tolerated the procedure well. Recommendations:     - Await pathology. - Repeat colonoscopy in 3 years.      - If < 10 years, reason: above average risk patient    Signed By: Juan Diego Feliz MD August 16, 2017

## 2018-03-13 ENCOUNTER — OFFICE VISIT (OUTPATIENT)
Dept: DERMATOLOGY | Facility: AMBULATORY SURGERY CENTER | Age: 73
End: 2018-03-13

## 2018-03-13 VITALS
HEIGHT: 70 IN | DIASTOLIC BLOOD PRESSURE: 78 MMHG | RESPIRATION RATE: 16 BRPM | OXYGEN SATURATION: 98 % | WEIGHT: 190 LBS | BODY MASS INDEX: 27.2 KG/M2 | HEART RATE: 52 BPM | TEMPERATURE: 98.2 F | SYSTOLIC BLOOD PRESSURE: 120 MMHG

## 2018-03-13 DIAGNOSIS — D22.9 MULTIPLE BENIGN NEVI: ICD-10-CM

## 2018-03-13 DIAGNOSIS — L72.9 CYST OF SKIN: ICD-10-CM

## 2018-03-13 DIAGNOSIS — Z85.828 FOLLOW-UP SURVEILLANCE OF SKIN CANCER, ENCOUNTER FOR: ICD-10-CM

## 2018-03-13 DIAGNOSIS — L57.0 ACTINIC KERATOSIS: Primary | ICD-10-CM

## 2018-03-13 DIAGNOSIS — L82.1 SEBORRHEIC KERATOSES: ICD-10-CM

## 2018-03-13 DIAGNOSIS — Z08 FOLLOW-UP SURVEILLANCE OF SKIN CANCER, ENCOUNTER FOR: ICD-10-CM

## 2018-03-13 DIAGNOSIS — D18.01 CHERRY ANGIOMA: ICD-10-CM

## 2018-03-13 DIAGNOSIS — Z85.828 HISTORY OF NONMELANOMA SKIN CANCER: ICD-10-CM

## 2018-03-13 NOTE — PROGRESS NOTES
Name: Saint Ok       Age: 75 y.o. Date: 3/13/2018    Chief Complaint:   Chief Complaint   Patient presents with    Skin Exam     Left ear       Subjective:    HPI  Mr. Saint Ok is a 75 y.o. male who presents for a full skin exam.  The patient's last skin exam was on 5/16/17 and the patient does have current complaints related to his skin. He reports a growth on the right clavicle and a scaly lesion on the left ear     The patient's pertinent skin history includes : BCC nose and scalp    ROS: Constitutional: Negative. Dermatological : positive for - skin lesion changes      Social History     Social History    Marital status:      Spouse name: N/A    Number of children: N/A    Years of education: N/A     Occupational History    Not on file. Social History Main Topics    Smoking status: Former Smoker    Smokeless tobacco: Never Used    Alcohol use Yes      Comment: \"socially\"    Drug use: Not on file    Sexual activity: Not on file     Other Topics Concern    Not on file     Social History Narrative       History reviewed. No pertinent family history. Past Medical History:   Diagnosis Date    History of actinic keratoses     Seizure disorder ([facility] [address]     Shoulder fracture, right 2017    Skin cancer 9/17/2012    basal cell carcinoma-right nose,scalp       Past Surgical History:   Procedure Laterality Date    COLONOSCOPY N/A 8/14/2017    COLONOSCOPY performed by Camille Jackson MD at [address] HX MOHS PROCEDURES  06/27/2017    BCC vertex scalp by Dr. Kt Grady HX OTHER SURGICAL Left     shoulder surgery       Current Outpatient Prescriptions   Medication Sig Dispense Refill    simvastatin (ZOCOR) 40 mg tablet       carBAMazepine (TEGRETOL) 100 mg chewable tablet Take 200 mg by mouth daily.       bupivacaine-EPINEPHrine (MARCAINE-EPINEPHRINE) 0.25 %-1:200,000 soln Used for MOH's surgery 1.5 mL 0    bupivacaine-EPINEPHrine (MARCAINE-EPINEPHRINE) 0.25 %-1:200,000 Soln For surgical procedure today only 3 mL 0       No Known Allergies      Objective:    Visit Vitals    /78 (BP 1 Location: Right arm, BP Patient Position: Sitting)    Pulse (!) 52    Temp 98.2 °F (36.8 °C) (Oral)    Resp 16    Ht 5' 10\" (1.778 m)    Wt 86.2 kg (190 lb)    SpO2 98%    BMI 27.26 kg/m2       Sabine Schmid is a 75 y.o. male who appears well and in no distress. He is awake, alert, and oriented. There is no preauricular, submandibular, or cervical lymphadenopathy. A skin examination was performed including his scalp, face (including eyelids), ears, neck, chest, back, abdomen, upper extremities (including digits/nails), breasts. There are lentigines on sun exposed areas. He has a fibrous papule on the right nasal tip. There are well healed scars on the nose and scalp without evidence of lesion recurrence. There are scattered cherry angiomas and seborrheic keratoses. There are pink scaly macules on the cheeks and left ear consistent with Actinic Keratoses. There is a yellow papule on the right clavicular area consistent with a small cyst. There are medium brown junctional nevi without concerning features. Assessment/Plan: 1. Personal history of skin cancer. I discussed sun protection, sunscreen use, the warning signs of skin cancer, the need for self-skin examinations, and the need for regular practitioner exams every 6 months. The patient should follow up sooner as needed if new, changing, or symptomatic skin lesions arise. 2.Seborrheic keratoses. The diagnosis was reviewed and the patient was reassured that no treatment is needed for these benign lesions. 3.Cherry angiomas. The diagnosis was reviewed and the patient was reassured that no treatment is needed for these benign lesions. 4.Actinic Keratoses. The diagnosis of this precancerous lesion related to sun exposure was reviewed. Verbal consent was obtained.   I treated 6 lesions with cryotherapy and post-cryotherapy care was reviewed. 5.Normal nevi. The diagnosis of normal nevi was reviewed. I discussed sun protection, sunscreen use, the warning signs of skin cancer, the need for self-skin examinations, and the need for regular practitioner exams every 6 months. The patient should follow up sooner as needed if new, changing, or symptomatic skin lesions arise. 6. Cyst of skin. Reassured and offered treatment if symptomatic or growing. [facility]   OFFICE PROCEDURE PROGRESS NOTE   Chart reviewed for the following:   Boo MENEDZ, have reviewed the History, Physical and updated the Allergic reactions for [address] performed immediately prior to start of procedure:   Boo MENDEZ, have performed the following reviews on Carrie Potter   prior to the start of the procedure:     * Patient was identified by name and date of birth   * Agreement on procedure being performed was verified   * Risks and Benefits explained to the patient   * Procedure site verified and marked as necessary   * Patient was positioned for comfort   * Verbal consent was obtained    Time: 0845  Date of procedure: 3/13/2018  Procedure performed by: Lesia Du.  Glenard Primrose, DNP  Provider assisted by: none   Patient assisted by: self   How tolerated by patient: tolerated the procedure well with no complications   Comments: none

## 2018-03-13 NOTE — MR AVS SNAPSHOT
[address] [facility] [address] 
[phone] Patient: Carrie Duenas MRN: [id number] Visit Information Date & Time Provider Department Dept. Phone Encounter #  
 3/13/2018  8:30 AM JUSTIN Stanton [id number] [phone] [id number] Upcoming Health Maintenance Date Due DTaP/Tdap/Td series (1 - Tdap) 2/16/1965 FOBT Q 1 YEAR AGE 50-75 2/16/1994 ZOSTER VACCINE AGE 60> 12/16/2003 GLAUCOMA SCREENING Q2Y 2/16/2009 Bone Densitometry (Dexa) Screening 2/16/2009 Pneumococcal 65+ Low/Medium Risk (1 of 2 - PCV13) 2/16/2009 MEDICARE YEARLY EXAM 2/16/2009 Influenza Age 5 to Adult 8/1/2017 Allergies as of 3/13/2018  Review Complete On: 3/13/2018 By: Kate Smith LPN No Known Allergies Current Immunizations  Never Reviewed No immunizations on file. Not reviewed this visit Vitals BP Pulse Temp Resp Height(growth percentile) Weight(growth percentile) 120/78 (BP 1 Location: Right arm, BP Patient Position: Sitting) (!) 52 98.2 °F (36.8 °C) (Oral) 16 5' 10\" (1.778 m) 190 lb (86.2 kg) SpO2 BMI Smoking Status 98% 27.26 kg/m2 Former Smoker BMI and BSA Data Body Mass Index Body Surface Area  
 27.26 kg/m 2 2.06 m 2 Preferred Pharmacy Pharmacy Name Phone [id number], [address] [facility] [phone] Your Updated Medication List  
  
   
This list is accurate as of 3/13/18  8:37 AM.  Always use your most recent med list.  
  
  
  
  
 * bupivacaine-EPINEPHrine 0.25 %-1:200,000 Soln Commonly known as:  Bennie Mazzoni For surgical procedure today only * bupivacaine-EPINEPHrine 0.25 %-1:200,000 Soln Commonly known as:  Houston Mazzoni Used for [facility] surgery  
  
 simvastatin 40 mg tablet Commonly known as:  ZOCOR  
  
 TEGretol 100 mg chewable tablet Generic drug:  carBAMazepine Take 200 mg by mouth daily. * Notice: This list has 2 medication(s) that are the same as other medications prescribed for you. Read the directions carefully, and ask your doctor or other care provider to review them with you. Patient Instructions Self Skin Exam and Sunscreens Early detection and treatment is essential in the treatment of all forms of skin cancer. If caught early, all forms of skin cancer are curable. In addition to your regular visits, you should perform a monthly skin examination. Over time, you become familiar with what is normally found on your skin and can identify new or suspicious spots. One of the screening tools you can use to assess your skin is to follow the ABCDEs: 
 
A= Asymmetry (One half is unlike the other half) B= Border (An irregular, scalloped or poorly defined edge) C= Color (Is varied from one area to another, has shades of tan, brown/ black,       white, red or blue) D= Diameter (Spots larger than 6mm or a pencil eraser) E= Evolving (New spots or one that is changing in size, shape, or color) A follow- up interval will be customized based on your history of skin cancer or level of skin damage and risk factors. In any case, if you notice a suspicious or new spot, an appointment should be arranged between regular visits. Everyone should use sunscreen and sun-safe practices, which is especially important for those with a personal or family history of skin cancer. Suggestions for this include: 1. Use daily moisturizers containing SPF 30 or higher. 2. Wear long sleeve clothing with UPF ratings and a broad-brimmed hat. 3. Apply sunscreen with SPF 30 or higher to all sun exposed areas if you are going to be in the sun. A broad spectrum UVA/ UVB sunscreen is best.  Dont forget to REAPPLY every two hours or more often if swimming or sweating! 4. Avoid outside activities during peak sun hours, especially in the summer (10am- 2pm). 5. DO NOT use tanning beds. Using sunscreen and sun-safe practices can help reduce the likelihood of developing skin cancer or additional skin cancers in those previously diagnosed. Introducing [facility]! Lacie Arora introduces [employer] patient portal. Now you can access parts of your medical record, email your doctor's office, and request medication refills online. 1. In your internet browser, go to [email or web address]. [employer]/[facility] 2. Click on the First Time User? Click Here link in the Sign In box. You will see the New Member Sign Up page. 3. Enter your [employer] Access Code exactly as it appears below. You will not need to use this code after youve completed the sign-up process. If you do not sign up before the expiration date, you must request a new code. · [employer] Access Code: [email or web address] Expires: 6/11/2018  8:37 AM 
 
4. Enter the last four digits of your Social Security Number (xxxx) and Date of Birth (mm/dd/yyyy) as indicated and click Submit. You will be taken to the next sign-up page. 5. Create a [employer] ID. This will be your [employer] login ID and cannot be changed, so think of one that is secure and easy to remember. 6. Create a [employer] password. You can change your password at any time. 7. Enter your Password Reset Question and Answer. This can be used at a later time if you forget your password. 8. Enter your e-mail address. You will receive e-mail notification when new information is available in [address] 9. Click Sign Up. You can now view and download portions of your medical record. 10. Click the Download Summary menu link to download a portable copy of your medical information. If you have questions, please visit the Frequently Asked Questions section of the [employer] website. Remember, [employer] is NOT to be used for urgent needs. For medical emergencies, dial 911. Now available from your iPhone and Android! Please provide this summary of care documentation to your next provider. Your primary care clinician is listed as Jane Whitley. If you have any questions after today's visit, please call [phone].

## 2018-10-09 ENCOUNTER — NEW PATIENT (OUTPATIENT)
Dept: URBAN - METROPOLITAN AREA CLINIC 85 | Facility: CLINIC | Age: 73
End: 2018-10-09
Payer: MEDICARE

## 2018-10-09 DIAGNOSIS — H35.371 PUCKERING OF MACULA, RIGHT EYE: ICD-10-CM

## 2018-10-09 DIAGNOSIS — H25.13 AGE-RELATED NUCLEAR CATARACT, BILATERAL: Primary | ICD-10-CM

## 2018-10-09 PROCEDURE — 92004 COMPRE OPH EXAM NEW PT 1/>: CPT | Performed by: OPHTHALMOLOGY

## 2018-10-09 PROCEDURE — 92134 CPTRZ OPH DX IMG PST SGM RTA: CPT | Performed by: OPHTHALMOLOGY

## 2018-10-09 RX ORDER — KETOROLAC TROMETHAMINE 5 MG/ML
0.5 % SOLUTION OPHTHALMIC
Qty: 1 | Refills: 1 | Status: INACTIVE
Start: 2018-10-09 | End: 2018-12-19

## 2018-10-09 ASSESSMENT — INTRAOCULAR PRESSURE
OD: 16
OS: 16

## 2018-10-09 ASSESSMENT — KERATOMETRY
OD: 44.25
OS: 44.13

## 2018-10-09 ASSESSMENT — VISUAL ACUITY
OS: 20/25
OD: 20/25

## 2018-11-01 ENCOUNTER — Encounter (OUTPATIENT)
Dept: URBAN - METROPOLITAN AREA CLINIC 85 | Facility: CLINIC | Age: 73
End: 2018-11-01
Payer: MEDICARE

## 2018-11-01 DIAGNOSIS — Z01.818 ENCOUNTER FOR OTHER PREPROCEDURAL EXAMINATION: Primary | ICD-10-CM

## 2018-11-01 PROCEDURE — 99213 OFFICE O/P EST LOW 20 MIN: CPT | Performed by: PHYSICIAN ASSISTANT

## 2018-11-15 ENCOUNTER — SURGERY (OUTPATIENT)
Dept: URBAN - METROPOLITAN AREA SURGERY 55 | Facility: SURGERY | Age: 73
End: 2018-11-15
Payer: MEDICARE

## 2018-11-15 PROCEDURE — 66984 XCAPSL CTRC RMVL W/O ECP: CPT | Performed by: OPHTHALMOLOGY

## 2018-11-16 ENCOUNTER — POST OP (OUTPATIENT)
Dept: URBAN - METROPOLITAN AREA CLINIC 85 | Facility: CLINIC | Age: 73
End: 2018-11-16

## 2018-11-16 DIAGNOSIS — Z96.1 PRESENCE OF INTRAOCULAR LENS: Primary | ICD-10-CM

## 2018-11-16 PROCEDURE — 99024 POSTOP FOLLOW-UP VISIT: CPT | Performed by: OPTOMETRIST

## 2018-11-16 ASSESSMENT — INTRAOCULAR PRESSURE: OD: 11

## 2018-11-21 ENCOUNTER — POST OP (OUTPATIENT)
Dept: URBAN - METROPOLITAN AREA CLINIC 85 | Facility: CLINIC | Age: 73
End: 2018-11-21

## 2018-11-21 PROCEDURE — 99024 POSTOP FOLLOW-UP VISIT: CPT | Performed by: OPTOMETRIST

## 2018-11-21 ASSESSMENT — VISUAL ACUITY
OD: 20/25
OS: 20/25

## 2018-11-21 ASSESSMENT — INTRAOCULAR PRESSURE
OS: 18
OD: 13

## 2018-11-29 ENCOUNTER — SURGERY (OUTPATIENT)
Dept: URBAN - METROPOLITAN AREA SURGERY 55 | Facility: SURGERY | Age: 73
End: 2018-11-29
Payer: MEDICARE

## 2018-11-29 ENCOUNTER — POST OP (OUTPATIENT)
Dept: URBAN - METROPOLITAN AREA CLINIC 85 | Facility: CLINIC | Age: 73
End: 2018-11-29

## 2018-11-29 PROCEDURE — 99024 POSTOP FOLLOW-UP VISIT: CPT | Performed by: OPTOMETRIST

## 2018-11-29 PROCEDURE — 66984 XCAPSL CTRC RMVL W/O ECP: CPT | Performed by: OPHTHALMOLOGY

## 2018-11-29 ASSESSMENT — INTRAOCULAR PRESSURE
OS: 12
OS: 12

## 2018-12-26 ENCOUNTER — POST OP (OUTPATIENT)
Dept: URBAN - METROPOLITAN AREA CLINIC 85 | Facility: CLINIC | Age: 73
End: 2018-12-26

## 2018-12-26 PROCEDURE — 99024 POSTOP FOLLOW-UP VISIT: CPT | Performed by: OPHTHALMOLOGY

## 2018-12-26 ASSESSMENT — VISUAL ACUITY
OD: 20/25
OS: 20/20

## 2018-12-26 ASSESSMENT — INTRAOCULAR PRESSURE
OS: 10
OD: 10

## 2021-05-07 ENCOUNTER — HOSPITAL ENCOUNTER (OUTPATIENT)
Dept: PREADMISSION TESTING | Age: 76
Discharge: HOME | End: 2021-05-07
Payer: MEDICARE

## 2021-05-07 ENCOUNTER — TRANSCRIBE ORDER (OUTPATIENT)
Dept: REGISTRATION | Age: 76
End: 2021-05-07

## 2021-05-07 DIAGNOSIS — Z01.812 PRE-PROCEDURE LAB EXAM: ICD-10-CM

## 2021-05-07 DIAGNOSIS — Z01.812 PRE-PROCEDURE LAB EXAM: Primary | ICD-10-CM

## 2021-05-07 PROCEDURE — U0003 INFECTIOUS AGENT DETECTION BY NUCLEIC ACID (DNA OR RNA); SEVERE ACUTE RESPIRATORY SYNDROME CORONAVIRUS 2 (SARS-COV-2) (CORONAVIRUS DISEASE [COVID-19]), AMPLIFIED PROBE TECHNIQUE, MAKING USE OF HIGH THROUGHPUT TECHNOLOGIES AS DESCRIBED BY CMS-2020-01-R: HCPCS

## 2021-05-08 LAB — SARS-COV-2, COV2NT: DETECTED

## 2021-05-28 ENCOUNTER — TRANSCRIBE ORDER (OUTPATIENT)
Dept: REGISTRATION | Age: 76
End: 2021-05-28

## 2021-05-28 ENCOUNTER — HOSPITAL ENCOUNTER (OUTPATIENT)
Dept: PREADMISSION TESTING | Age: 76
Discharge: HOME | End: 2021-05-28
Payer: MEDICARE

## 2021-05-28 DIAGNOSIS — Z01.812 PRE-PROCEDURE LAB EXAM: Primary | ICD-10-CM

## 2021-05-28 DIAGNOSIS — Z01.812 PRE-PROCEDURE LAB EXAM: ICD-10-CM

## 2021-05-28 PROCEDURE — U0003 INFECTIOUS AGENT DETECTION BY NUCLEIC ACID (DNA OR RNA); SEVERE ACUTE RESPIRATORY SYNDROME CORONAVIRUS 2 (SARS-COV-2) (CORONAVIRUS DISEASE [COVID-19]), AMPLIFIED PROBE TECHNIQUE, MAKING USE OF HIGH THROUGHPUT TECHNOLOGIES AS DESCRIBED BY CMS-2020-01-R: HCPCS

## 2021-05-29 LAB — SARS-COV-2, COV2NT: NOT DETECTED

## 2021-05-31 ENCOUNTER — ANESTHESIA EVENT (OUTPATIENT)
Dept: ENDOSCOPY | Age: 76
End: 2021-05-31
Payer: MEDICARE

## 2021-06-01 ENCOUNTER — ANESTHESIA (OUTPATIENT)
Dept: ENDOSCOPY | Age: 76
End: 2021-06-01
Payer: MEDICARE

## 2021-06-01 ENCOUNTER — HOSPITAL ENCOUNTER (OUTPATIENT)
Age: 76
Setting detail: OUTPATIENT SURGERY
Discharge: HOME | End: 2021-06-01
Attending: SPECIALIST | Admitting: SPECIALIST
Payer: MEDICARE

## 2021-06-01 VITALS
HEART RATE: 64 BPM | DIASTOLIC BLOOD PRESSURE: 69 MMHG | SYSTOLIC BLOOD PRESSURE: 113 MMHG | RESPIRATION RATE: 19 BRPM | WEIGHT: 190 LBS | OXYGEN SATURATION: 96 % | TEMPERATURE: 97.8 F | BODY MASS INDEX: 27.2 KG/M2 | HEIGHT: 70 IN

## 2021-06-01 PROCEDURE — 74011250636 HC RX REV CODE- 250/636: Performed by: NURSE ANESTHETIST, CERTIFIED REGISTERED

## 2021-06-01 PROCEDURE — 76060000032 HC ANESTHESIA 0.5 TO 1 HR: Performed by: SPECIALIST

## 2021-06-01 PROCEDURE — 74011000250 HC RX REV CODE- 250: Performed by: NURSE ANESTHETIST, CERTIFIED REGISTERED

## 2021-06-01 PROCEDURE — 88305 TISSUE EXAM BY PATHOLOGIST: CPT

## 2021-06-01 PROCEDURE — 74011250637 HC RX REV CODE- 250/637: Performed by: SPECIALIST

## 2021-06-01 PROCEDURE — 76040000007: Performed by: SPECIALIST

## 2021-06-01 PROCEDURE — 77030021593 HC FCPS BIOP ENDOSC BSC -A: Performed by: SPECIALIST

## 2021-06-01 PROCEDURE — 2709999900 HC NON-CHARGEABLE SUPPLY: Performed by: SPECIALIST

## 2021-06-01 RX ORDER — SODIUM CHLORIDE 0.9 % (FLUSH) 0.9 %
5-40 SYRINGE (ML) INJECTION AS NEEDED
Status: DISCONTINUED | OUTPATIENT
Start: 2021-06-01 | End: 2021-06-01 | Stop reason: HOSPADM

## 2021-06-01 RX ORDER — DEXTROMETHORPHAN/PSEUDOEPHED 2.5-7.5/.8
1.2 DROPS ORAL
Status: DISCONTINUED | OUTPATIENT
Start: 2021-06-01 | End: 2021-06-01 | Stop reason: HOSPADM

## 2021-06-01 RX ORDER — NALOXONE HYDROCHLORIDE 0.4 MG/ML
0.4 INJECTION, SOLUTION INTRAMUSCULAR; INTRAVENOUS; SUBCUTANEOUS
Status: DISCONTINUED | OUTPATIENT
Start: 2021-06-01 | End: 2021-06-01 | Stop reason: HOSPADM

## 2021-06-01 RX ORDER — PROPOFOL 10 MG/ML
INJECTION, EMULSION INTRAVENOUS AS NEEDED
Status: DISCONTINUED | OUTPATIENT
Start: 2021-06-01 | End: 2021-06-01 | Stop reason: HOSPADM

## 2021-06-01 RX ORDER — EPINEPHRINE 0.1 MG/ML
1 INJECTION INTRACARDIAC; INTRAVENOUS
Status: DISCONTINUED | OUTPATIENT
Start: 2021-06-01 | End: 2021-06-01 | Stop reason: HOSPADM

## 2021-06-01 RX ORDER — SODIUM CHLORIDE 9 MG/ML
INJECTION, SOLUTION INTRAVENOUS
Status: DISCONTINUED | OUTPATIENT
Start: 2021-06-01 | End: 2021-06-01 | Stop reason: HOSPADM

## 2021-06-01 RX ORDER — ATROPINE SULFATE 0.1 MG/ML
0.5 INJECTION INTRAVENOUS
Status: DISCONTINUED | OUTPATIENT
Start: 2021-06-01 | End: 2021-06-01 | Stop reason: HOSPADM

## 2021-06-01 RX ORDER — SODIUM CHLORIDE 9 MG/ML
50 INJECTION, SOLUTION INTRAVENOUS CONTINUOUS
Status: DISCONTINUED | OUTPATIENT
Start: 2021-06-01 | End: 2021-06-01 | Stop reason: HOSPADM

## 2021-06-01 RX ORDER — FLUMAZENIL 0.1 MG/ML
0.2 INJECTION INTRAVENOUS
Status: DISCONTINUED | OUTPATIENT
Start: 2021-06-01 | End: 2021-06-01 | Stop reason: HOSPADM

## 2021-06-01 RX ORDER — SODIUM CHLORIDE 0.9 % (FLUSH) 0.9 %
5-40 SYRINGE (ML) INJECTION EVERY 8 HOURS
Status: DISCONTINUED | OUTPATIENT
Start: 2021-06-01 | End: 2021-06-01 | Stop reason: HOSPADM

## 2021-06-01 RX ORDER — LIDOCAINE HYDROCHLORIDE 20 MG/ML
INJECTION, SOLUTION EPIDURAL; INFILTRATION; INTRACAUDAL; PERINEURAL AS NEEDED
Status: DISCONTINUED | OUTPATIENT
Start: 2021-06-01 | End: 2021-06-01 | Stop reason: HOSPADM

## 2021-06-01 RX ADMIN — PROPOFOL 50 MG: 10 INJECTION, EMULSION INTRAVENOUS at 08:35

## 2021-06-01 RX ADMIN — PROPOFOL 25 MG: 10 INJECTION, EMULSION INTRAVENOUS at 08:45

## 2021-06-01 RX ADMIN — PROPOFOL 25 MG: 10 INJECTION, EMULSION INTRAVENOUS at 08:37

## 2021-06-01 RX ADMIN — PROPOFOL 25 MG: 10 INJECTION, EMULSION INTRAVENOUS at 08:43

## 2021-06-01 RX ADMIN — PROPOFOL 25 MG: 10 INJECTION, EMULSION INTRAVENOUS at 08:55

## 2021-06-01 RX ADMIN — SODIUM CHLORIDE: 900 INJECTION, SOLUTION INTRAVENOUS at 08:15

## 2021-06-01 RX ADMIN — PROPOFOL 50 MG: 10 INJECTION, EMULSION INTRAVENOUS at 08:36

## 2021-06-01 RX ADMIN — PROPOFOL 25 MG: 10 INJECTION, EMULSION INTRAVENOUS at 08:49

## 2021-06-01 RX ADMIN — LIDOCAINE HYDROCHLORIDE 60 MG: 20 INJECTION, SOLUTION EPIDURAL; INFILTRATION; INTRACAUDAL; PERINEURAL at 08:35

## 2021-06-01 NOTE — ANESTHESIA POSTPROCEDURE EVALUATION
Post-Anesthesia Evaluation and Assessment    Patient: Rob Rob MRN: [id number]  SSN: [id number]    YOB: 1945  Age: 67 y.o. Sex: male      I have evaluated the patient and they are stable and ready for discharge from the PACU. Cardiovascular Function/Vital Signs  Visit Vitals  /69   Pulse 64   Temp 36.6 °C (97.8 °F)   Resp 19   Ht 5' 10\" (1.778 m)   Wt 86.2 kg (190 lb)   SpO2 96%   BMI 27.26 kg/m²       Patient is status post MAC anesthesia for Procedure(s):  COLONOSCOPY   :-  ENDOSCOPIC POLYPECTOMY. Nausea/Vomiting: None    Postoperative hydration reviewed and adequate. Pain:  Pain Scale 1: Numeric (0 - 10) (06/01/21 0927)  Pain Intensity 1: 0 (06/01/21 0927)   Managed    Neurological Status: At baseline    Mental Status, Level of Consciousness: Alert and  oriented to person, place, and time    Pulmonary Status:   O2 Device: None (Room air) (06/01/21 0927)   Adequate oxygenation and airway patent    Complications related to anesthesia: None    Post-anesthesia assessment completed. No concerns    Signed By: Toya White MD     June 1, 2021              Procedure(s):  COLONOSCOPY   :-  ENDOSCOPIC POLYPECTOMY.     MAC    <BSHSIANPOST>    INITIAL Post-op Vital signs:   Vitals Value Taken Time   /69 06/01/21 0927   Temp 36.6 °C (97.8 °F) 06/01/21 0912   Pulse 64 06/01/21 0927   Resp 19 06/01/21 0927   SpO2 96 % 06/01/21 0927

## 2021-06-01 NOTE — PROCEDURES
[address], [address] [place] [address]                 Colonoscopy Procedure Note    Indications:   See Preoperative Diagnosis above  Referring Physician: Codie Owens MD  Anesthesia/Sedation: MAC anesthesia Propofol  Endoscopist:  Dr. Dori Arango  Assistant:  Endoscopy Technician-1: Gaetano Santizo IV  Endoscopy RN-1: Lorin Melgoza RN  Preoperative diagnosis: PERSONAL HISTORY COLON POLYPS  Postoperative diagnosis: Severe Divertiuclosis  Rectal Polyp    Procedure in Detail:  Informed consent was obtained for the procedure, including sedation. Risks of perforation, hemorrhage, adverse drug reaction, and aspiration were discussed. The patient was placed in the left lateral decubitus position. Based on the pre-procedure assessment, including review of the patient's medical history, medications, allergies, and review of systems, he had been deemed to be an appropriate candidate for moderate sedation; he was therefore sedated with the medications listed above. The patient was monitored continuously with ECG tracing, pulse oximetry, blood pressure monitoring, and direct observations. A rectal examination was performed. The [id number] was inserted into the rectum and advanced under direct vision to the cecum, which was identified by the ileocecal valve and appendiceal orifice. The quality of the colonic preparation was good. A careful inspection was made as the colonoscope was withdrawn, including a retroflexed view of the rectum; findings and interventions are described below. Appropriate photodocumentation was obtained. Findings:  Rectum: 3 mm polyp removed with cold biopsy  Sigmoid: severe diverticulosis; Descending Colon: severe diverticulosis;  Transverse Colon: moderate diverticulosis; Ascending Colon: mild diverticulosis;   Cecum: normal      Specimens:    rectal polyp    EBL: None    Complications: None; patient tolerated the procedure well.    Recommendations:     - Await pathology. No follow up colonoscopy needed due to age.       Signed By: oMnique Magana MD                        June 1, 2021

## 2021-06-01 NOTE — H&P
Colonoscopy History and Physical      The patient was seen and examined. Date of last colonoscopy: 2017, Polyps  Yes      The airway was assessed and documented. The problem list, past medical history, and medications were reviewed. Patient Active Problem List   Diagnosis Code    History of basal cell cancer Z85.828     Social History     Socioeconomic History    Marital status:      Spouse name: Not on file    Number of children: Not on file    Years of education: Not on file    Highest education level: Not on file   Occupational History    Not on file   Tobacco Use    Smoking status: Former Smoker    Smokeless tobacco: Never Used   Substance and Sexual Activity    Alcohol use: Yes     Comment: \"socially\"    Drug use: Not on file    Sexual activity: Not on file   Other Topics Concern    Not on file   Social History Narrative    Not on file     Social Determinants of Health     Financial Resource Strain:     Difficulty of Paying Living Expenses:    Food Insecurity:     Worried About Running Out of Food in the Last Year:     [address] in the Last Year:    Transportation Needs:     Lack of Transportation (Medical):      Lack of Transportation (Non-Medical):    Physical Activity:     Days of Exercise per Week:     Minutes of Exercise per Session:    Stress:     Feeling of Stress :    Social Connections:     Frequency of Communication with Friends and Family:     Frequency of Social Gatherings with Friends and Family:     Attends [employer] Services:     Active Member of Clubs or Organizations:     Attends Club or Organization Meetings:     Marital Status:    Intimate Partner Violence:     Fear of Current or Ex-Partner:     Emotionally Abused:     Physically Abused:     Sexually Abused:      Past Medical History:   Diagnosis Date    History of actinic keratoses     Seizure disorder ([facility] [address]     Shoulder fracture, right 2017    Skin cancer 9/17/2012    basal cell carcinoma-right nose,scalp         Prior to Admission Medications   Prescriptions Last Dose Informant Patient Reported? Taking? L.acid/L.casei/B.bif/B.salazar/FOS (PROBIOTIC BLEND PO) 5/31/2021 at Unknown time  Yes Yes   Sig: Take  by mouth.   carBAMazepine (TEGRETOL) 100 mg chewable tablet 6/1/2021 at Unknown time  Yes Yes   Sig: Take 200 mg by mouth daily. ergocalciferol, vitamin D2, (VITAMIN D2 PO) 5/31/2021 at Unknown time  Yes Yes   Sig: Take  by mouth. simvastatin (ZOCOR) 40 mg tablet 6/1/2021 at Unknown time  Yes Yes      Facility-Administered Medications: None       The patient was seen and examined in the endoscopy suite. The airway was assessed and documented. The problem list and medications were reviewed. Chief complaint, history of present illness, and review of systems and Past medical History are positive for: colon polyps    The heart, lungs and mental status were satisfactory for the administration of sedation and for the procedure. I discussed with the patient the objectives, risks, consequences and alternatives to the procedure. The patient was counseled at length about the risks of mora Covid-19 in the breana-operative and post-operative states including the recovery window of their procedure. The patient was made aware that mora Covid-19 after a surgical procedure may worsen their prognosis for recovering from the virus and lend to a higher morbidity and or mortality risk. The patient was given the options of postponing their procedure. All of the risks, benefits, and alternatives were discussed. The patient does  wish to proceed with the procedure.     Plan: Endoscopic procedure with sedation     Aneta Cobb MD   6/1/2021  8:34 AM

## 2021-06-01 NOTE — ROUTINE PROCESS
[facility] 1945 
[id number] Situation: 
Verbal report received from: ADRIUS Razasom Procedure: Procedure(s): 
COLONOSCOPY   :- 
ENDOSCOPIC POLYPECTOMY Background: 
 
Preoperative diagnosis: PERSONAL HISTORY COLON POLYPS Postoperative diagnosis: Severe Divertiuclosis Rectal Polyp :  Dr. Feliberto Jeter Assistant(s): Endoscopy Technician-1: Juwan Grace 
Endoscopy RN-1: Randolph Madden RN Specimens:  
ID Type Source Tests Collected by Time Destination 1 : rectal polyp Preservative Rectum  Saji Parr MD 6/1/2021 0712 Pathology H. Pylori  no Assessment: 
Intra-procedure medications Anesthesia gave intra-procedure sedation and medications, see anesthesia flow sheet yes Intravenous fluids: NS@ Yesica Coffey Vital signs stable Abdominal assessment: round and soft Recommendation: 
Discharge patient per MD order. Family or Friend Permission to share finding with family or friend yes

## 2021-06-01 NOTE — ANESTHESIA PREPROCEDURE EVALUATION
Relevant Problems   No relevant active problems       Anesthetic History   No history of anesthetic complications            Review of Systems / Medical History  Patient summary reviewed, nursing notes reviewed and pertinent labs reviewed    Pulmonary  Within defined limits                 Neuro/Psych     seizures         Cardiovascular                  Exercise tolerance: >4 METS     GI/Hepatic/Renal  Within defined limits              Endo/Other  Within defined limits           Other Findings              Physical Exam    Airway  Mallampati: I  TM Distance: > 6 cm  Neck ROM: normal range of motion   Mouth opening: Normal     Cardiovascular    Rhythm: regular  Rate: normal         Dental  No notable dental hx       Pulmonary  Breath sounds clear to auscultation               Abdominal         Other Findings            Anesthetic Plan    ASA: 2  Anesthesia type: MAC          Induction: Intravenous  Anesthetic plan and risks discussed with: Patient

## 2021-06-01 NOTE — DISCHARGE INSTRUCTIONS
[facility]  [id number]  1945    COLON DISCHARGE INSTRUCTIONS  Discomfort:  Redness at IV site- apply warm compress to area; if redness or soreness persist- contact your physician  There may be a slight amount of blood passed from the rectum  Gaseous discomfort- walking, belching will help relieve any discomfort    DIET:   High fiber diet. - however -  remember your colon is empty and a heavy meal will produce gas. Avoid these foods:  vegetables, fried / greasy foods, carbonated drinks for today. You may not drink alcoholic beverages for at least 12 hours    MEDICATIONS:   Regarding Aspirin or Nonsteroidal medications, please see below. ACTIVITY:  You may resume your normal daily activities it is recommended that you spend the remainder of the day resting -  avoid any strenuous activity. You may not operate a vehicle for 12 hours  You may not engage in an occupation involving machinery or appliances for rest of today  Avoid making any critical decisions for at least 24 hour    CALL M.D. ANY SIGN OF:  Increasing pain, nausea, vomiting  Abdominal distension (swelling)  New increased bleeding (oral or rectal)  Fever (chills)  Pain in chest area  Bloody discharge from nose or mouth  Shortness of breath    You may not  take any Advil, Aspirin, Ibuprofen, Motrin, Aleve, or Goodys for 10 days, ONLY  Tylenol as needed for pain. Post procedure diagnosis: Severe Divertiuclosis  Rectal Polyp      Follow-up Instructions:   Call Dr. Alyce Mckinley  Results of procedure / biopsy in 10 days  Telephone #  [phone]      DISCHARGE SUMMARY from Nurse    The following personal items collected during your admission are returned to you:   Dental Appliance:    Vision: Visual Aid: Glasses  Hearing Aid:    Jewelry:    Clothing:    Other Valuables:    Valuables sent to safe:      Learning About Coronavirus (COVID-19)  Coronavirus (COVID-19): Overview  What is coronavirus ([id number])?   The coronavirus disease (COVID-19) is caused by a virus. It is an illness that was first found in [place], [place], in December 2019. It has since spread worldwide. The virus can cause fever, cough, and trouble breathing. In severe cases, it can cause pneumonia and make it hard to breathe without help. It can cause death. Coronaviruses are a large group of viruses. They cause the common cold. They also cause more serious illnesses like Middle East respiratory syndrome (MERS) and severe acute respiratory syndrome (SARS). COVID-19 is caused by a novel coronavirus. That means it's a new type that has not been seen in people before. This virus spreads person-to-person through droplets from coughing and sneezing. It can also spread when you are close to someone who is infected. And it can spread when you touch something that has the virus on it, such as a doorknob or a tabletop. What can you do to protect yourself from coronavirus (COVID-19)? The best way to protect yourself from getting sick is to:  · Avoid areas where there is an outbreak. · Avoid contact with people who may be infected. · Wash your hands often with soap or alcohol-based hand sanitizers. · Avoid crowds and try to stay at least 6 feet away from other people. · Wash your hands often, especially after you cough or sneeze. Use soap and water, and scrub for at least 20 seconds. If soap and water aren't available, use an alcohol-based hand . · Avoid touching your mouth, nose, and eyes. What can you do to avoid spreading the virus to others? To help avoid spreading the virus to others:  · Cover your mouth with a tissue when you cough or sneeze. Then throw the tissue in the trash. · Use a disinfectant to clean things that you touch often. · Stay home if you are sick or have been exposed to the virus. Don't go to school, work, or public areas. And don't use public transportation. · If you are sick:  ? Leave your home only if you need to get medical care.  But call the doctor's office first so they know you're coming. And wear a face mask, if you have one.  ? If you have a face mask, wear it whenever you're around other people. It can help stop the spread of the virus when you cough or sneeze. ? Clean and disinfect your home every day. Use household  and disinfectant wipes or sprays. Take special care to clean things that you grab with your hands. These include doorknobs, remote controls, phones, and handles on your refrigerator and microwave. And don't forget countertops, tabletops, bathrooms, and computer keyboards. When to call for help  Call 911 anytime you think you may need emergency care. For example, call if:  · You have severe trouble breathing. (You can't talk at all.)  · You have constant chest pain or pressure. · You are severely dizzy or lightheaded. · You are confused or can't think clearly. · Your face and lips have a blue color. · You pass out (lose consciousness) or are very hard to wake up. Call your doctor now if you develop symptoms such as:  · Shortness of breath. · Fever. · Cough. If you need to get care, call ahead to the doctor's office for instructions before you go. Make sure you wear a face mask, if you have one, to prevent exposing other people to the virus. Where can you get the latest information? The following health organizations are tracking and studying this virus. Their websites contain the most up-to-date information. Nelysigifredojacob Christie also learn what to do if you think you may have been exposed to the virus. · U.S. Centers for Disease Control and Prevention (CDC): The CDC provides updated news about the disease and travel advice. The website also tells you how to prevent the spread of infection. www.cdc.gov  · World Health Organization [facility]): WHO offers information about the virus outbreaks. WHO also has travel advice. www.who.int  Current as of: April 1, 2020               Content Version: 12.4  © 8915-6776 [employer].    Care instructions adapted under license by your healthcare professional. If you have questions about a medical condition or this instruction, always ask your healthcare professional. [address] any warranty or liability for your use of this information. Patient Education        Diverticulosis: Care Instructions  Your Care Instructions  In diverticulosis, pouches called diverticula form in the wall of the large intestine (colon). The pouches do not cause any pain or other symptoms. Most people who have diverticulosis do not know they have it. But the pouches sometimes bleed, and if they become infected, they can cause pain and other symptoms. When this happens, it is called diverticulitis. Diverticula form when pressure pushes the wall of the colon outward at certain weak points. A diet that is too low in fiber can cause diverticula. Follow-up care is a key part of your treatment and safety. Be sure to make and go to all appointments, and call your doctor if you are having problems. It's also a good idea to know your test results and keep a list of the medicines you take. How can you care for yourself at home? · Include fruits, leafy green vegetables, beans, and whole grains in your diet each day. These foods are high in fiber. · Take a fiber supplement, such as Citrucel or Metamucil, every day if needed. Read and follow all instructions on the label. · Drink plenty of fluids. If you have kidney, heart, or liver disease and have to limit fluids, talk with your doctor before you increase the amount of fluids you drink. · Get at least 30 minutes of exercise on most days of the week. Walking is a good choice. You also may want to do other activities, such as running, swimming, cycling, or playing tennis or team sports. · Cut out foods that cause gas, pain, or other symptoms. When should you call for help? Call your doctor now or seek immediate medical care if:    · You have belly pain.   · You pass maroon or very bloody stools.     · You have a fever.     · You have nausea and vomiting.     · You have unusual changes in your bowel movements or abdominal swelling.     · You have burning pain when you urinate.     · You have abnormal vaginal discharge.     · You have shoulder pain.     · You have cramping pain that does not get better when you have a bowel movement or pass gas.     · You pass gas or stool from your urethra while urinating. Watch closely for changes in your health, and be sure to contact your doctor if you have any problems. Where can you learn more? Go to http://www.gray.com/  Enter [id number] in the search box to learn more about \"Diverticulosis: Care Instructions. \"  Current as of: April 15, 2020               Content Version: 12.8  © 2006-2021 [employer]. Care instructions adapted under license by [employer] (which disclaims liability or warranty for this information). If you have questions about a medical condition or this instruction, always ask your healthcare professional. [place] [address] any warranty or liability for your use of this information.

## 2021-06-01 NOTE — PERIOP NOTES

## 2022-08-01 ENCOUNTER — OFFICE VISIT (OUTPATIENT)
Dept: ORTHOPEDIC SURGERY | Age: 77
End: 2022-08-01
Payer: MEDICARE

## 2022-08-01 VITALS — WEIGHT: 190 LBS | BODY MASS INDEX: 27.2 KG/M2 | HEIGHT: 70 IN

## 2022-08-01 DIAGNOSIS — S42.035A NONDISPLACED FRACTURE OF LATERAL END OF LEFT CLAVICLE, INITIAL ENCOUNTER FOR CLOSED FRACTURE: ICD-10-CM

## 2022-08-01 DIAGNOSIS — S49.92XA INJURY OF LEFT SHOULDER, INITIAL ENCOUNTER: ICD-10-CM

## 2022-08-01 DIAGNOSIS — M89.8X1 PAIN OF LEFT CLAVICLE: Primary | ICD-10-CM

## 2022-08-01 PROCEDURE — 1101F PT FALLS ASSESS-DOCD LE1/YR: CPT | Performed by: ORTHOPAEDIC SURGERY

## 2022-08-01 PROCEDURE — G8536 NO DOC ELDER MAL SCRN: HCPCS | Performed by: ORTHOPAEDIC SURGERY

## 2022-08-01 PROCEDURE — G8427 DOCREV CUR MEDS BY ELIG CLIN: HCPCS | Performed by: ORTHOPAEDIC SURGERY

## 2022-08-01 PROCEDURE — 99203 OFFICE O/P NEW LOW 30 MIN: CPT | Performed by: ORTHOPAEDIC SURGERY

## 2022-08-01 PROCEDURE — G8417 CALC BMI ABV UP PARAM F/U: HCPCS | Performed by: ORTHOPAEDIC SURGERY

## 2022-08-01 PROCEDURE — G8432 DEP SCR NOT DOC, RNG: HCPCS | Performed by: ORTHOPAEDIC SURGERY

## 2022-08-01 PROCEDURE — 1123F ACP DISCUSS/DSCN MKR DOCD: CPT | Performed by: ORTHOPAEDIC SURGERY

## 2022-08-01 NOTE — PROGRESS NOTES
Chen Rangel (: 1945) is a 65 y.o. male, patient, here for evaluation of the following chief complaint(s):  Shoulder Injury (Patient stated [place] onset 2022) and Shoulder Pain (Left)       HPI:    He began having increased left shoulder pain 2 days ago when he fell [place] on 2022. The patient describes his left shoulder pain as severe, dull, and intermittent. He has been experiencing some bruising in his left shoulder. The patient states that his pain level has improved over the last 2 days. He reports that twisting makes his pain worse and rest and ice makes pain better. He has been taking Tylenol and ibuprofen for his discomfort as needed. The patient was not seen in the emergency room and reports no previous or related left shoulder surgery. No Known Allergies    Current Outpatient Medications   Medication Sig    ergocalciferol, vitamin D2, (VITAMIN D2 PO) Take  by mouth. L.acid/L.casei/B.bif/B.salazar/FOS (PROBIOTIC BLEND PO) Take  by mouth. simvastatin (ZOCOR) 40 mg tablet     carBAMazepine (TEGretol) 100 mg chewable tablet Take 200 mg by mouth daily. No current facility-administered medications for this visit. Past Medical History:   Diagnosis Date    History of actinic keratoses     Seizure disorder ([facility] [address]     Shoulder fracture, right 2017    Skin cancer 2012    basal cell carcinoma-right nose,scalp        Past Surgical History:   Procedure Laterality Date    COLONOSCOPY N/A 2017    COLONOSCOPY performed by Charlene Scott MD at [facility] ENDOSCOPY    COLONOSCOPY N/A 2021    COLONOSCOPY   :- performed by Jaime Mccormick MD at [facility] ENDOSCOPY    [place]  2017    BCC vertex scalp by Dr. Kelsi Moore Left     shoulder surgery       History reviewed. No pertinent family history.      Social History     Socioeconomic History    Marital status:      Spouse name: Not on file    Number of children: Not on file    Years of education: Not on file    Highest education level: Not on file   Occupational History    Not on file   Tobacco Use    Smoking status: Former    Smokeless tobacco: Never   Substance and Sexual Activity    Alcohol use: Yes     Comment: \"socially\"    Drug use: Not on file    Sexual activity: Not on file   Other Topics Concern    Not on file   Social History Narrative    Not on file     Social Determinants of Health     Financial Resource Strain: Not on file   Food Insecurity: Not on file   Transportation Needs: Not on file   Physical Activity: Not on file   Stress: Not on file   Social Connections: Not on file   Intimate Partner Violence: Not on file   Housing Stability: Not on file       Review of Systems   All other systems reviewed and are negative. Vitals:  Ht 5' 10\" (1.778 m)   Wt 190 lb (86.2 kg)   BMI 27.26 kg/m²    Body mass index is 27.26 kg/m². Ortho Exam     The patient is well-developed and well-nourished. The patient presents today in alert and oriented x3 with a normal mood and affect. The patient stands with a normal weightbearing line and walks with a normal gait. Left shoulder has tenderness over the lateral aspect of his clavicle. He does have well-maintained elbow and wrist range of motion. His range of motion is limited secondary to his discomfort. He has approximately 90 degrees of forward flexion and 90 degrees of abduction. There is limitation in all planes secondary to his pain. His strength was not tested today. His sensations are intact his pulses are 2+. There is some bruising present but his skin is normal.    ASSESSMENT/PLAN:      1. Pain of left clavicle  2. Injury of left shoulder, initial encounter  3.  Nondisplaced fracture of lateral end of left clavicle, initial encounter for closed fracture  -     XR SHOULDER LT AP/LAT MIN 2 V; Future     XR Results (most recent):  Results from Appointment encounter on 08/01/22    XR SHOULDER LT AP/LAT MIN 2 V    Narrative  Left shoulder and chest x-ray show evidence very minimally displaced nonangulated lateral clavicle fracture. Below is the assessment and plan developed based on review of pertinent history, physical exam, labs, studies, and medications. We discussed the patient's left shoulder pain and his signs, symptoms, physical exam, description of his pain, description of his injury, and x-rays are consistent with a very minimally displaced nonangulated fracture of the lateral aspect of the clavicle. The possible treatment options were discussed with the patient and because of the nature of his fracture and minimal displacement with no angulation we will treat his pain with rest, ice, activity modification, and anti-inflammatory medication. The patient will work on range of motion, strengthening, and stretching exercises with an at-home exercise program as pain tolerates. He is to avoid any overhead activities and significant lifting at this time. I will see him back in 3 weeks for reevaluation and further discussion of the post fracture protocol. Return in about 3 weeks (around 8/22/2022) for Reevaluation and likely hermila-ray. An electronic signature was used to authenticate this note.   -- Dyan Traore MD

## 2022-08-01 NOTE — PROGRESS NOTES
Identified pt with two pt identifiers(name and ). Reviewed record in preparation for visit and have obtained necessary documentation. All patient medications has been reviewed. Chief Complaint   Patient presents with    Shoulder Injury     Patient stated he fell [place] the [place] onset 2022       No flowsheet data found. No flowsheet data found. Health Maintenance Due   Topic    Hepatitis C Screening     Depression Screen     COVID-19 Vaccine (1)    Lipid Screen     DTaP/Tdap/Td series (1 - Tdap)    Shingrix Vaccine Age 50> (1 of 2)    Pneumococcal 65+ years (1 - PCV)    Medicare Yearly Exam      Health Maintenance Review: Patient reminded of \"due or due soon\" health maintenance. I have asked the patient to contact his/her primary care provider (PCP) for follow-up on his/her health maintenance. There were no vitals filed for this visit. Wt Readings from Last 3 Encounters:   21 190 lb (86.2 kg)   18 190 lb (86.2 kg)   17 190 lb (86.2 kg)     Temp Readings from Last 3 Encounters:   21 97.8 °F (36.6 °C)   18 98.2 °F (36.8 °C) (Oral)   17 98.3 °F (36.8 °C)     BP Readings from Last 3 Encounters:   21 113/69   18 120/78   17 113/71     Pulse Readings from Last 3 Encounters:   21 64   18 (!) 52   17 (!) 55       1. \"Have you been to the ER, urgent care clinic since your last visit? Hospitalized since your last visit? \" No    2. \"Have you seen or consulted any other health care providers outside of the [address] [place] [address] since your last visit? \" No

## 2022-08-15 ENCOUNTER — OFFICE VISIT (OUTPATIENT)
Dept: ORTHOPEDIC SURGERY | Age: 77
End: 2022-08-15
Payer: MEDICARE

## 2022-08-15 VITALS — WEIGHT: 185 LBS | HEIGHT: 70 IN | BODY MASS INDEX: 26.48 KG/M2

## 2022-08-15 DIAGNOSIS — S42.035D NONDISPLACED FRACTURE OF LATERAL END OF LEFT CLAVICLE, SUBSEQUENT ENCOUNTER FOR FRACTURE WITH ROUTINE HEALING: Primary | ICD-10-CM

## 2022-08-15 DIAGNOSIS — M89.8X1 PAIN OF LEFT CLAVICLE: ICD-10-CM

## 2022-08-15 PROCEDURE — 1101F PT FALLS ASSESS-DOCD LE1/YR: CPT | Performed by: ORTHOPAEDIC SURGERY

## 2022-08-15 PROCEDURE — G8536 NO DOC ELDER MAL SCRN: HCPCS | Performed by: ORTHOPAEDIC SURGERY

## 2022-08-15 PROCEDURE — 1123F ACP DISCUSS/DSCN MKR DOCD: CPT | Performed by: ORTHOPAEDIC SURGERY

## 2022-08-15 PROCEDURE — 99213 OFFICE O/P EST LOW 20 MIN: CPT | Performed by: ORTHOPAEDIC SURGERY

## 2022-08-15 PROCEDURE — G8427 DOCREV CUR MEDS BY ELIG CLIN: HCPCS | Performed by: ORTHOPAEDIC SURGERY

## 2022-08-15 PROCEDURE — G8417 CALC BMI ABV UP PARAM F/U: HCPCS | Performed by: ORTHOPAEDIC SURGERY

## 2022-08-15 PROCEDURE — G8432 DEP SCR NOT DOC, RNG: HCPCS | Performed by: ORTHOPAEDIC SURGERY

## 2022-08-15 NOTE — PROGRESS NOTES
Brigida Riggins (: 1945) is a 65 y.o. male, patient, here for evaluation of the following chief complaint(s):  Clavicle pain (left)       HPI:    He was last seen for his left clavicle/shoulder pain on 2022. The patient did fall [place] the golf [place] on 2022. The patient states that his pain level has improved since his last visit. He rates the severity of his left shoulder pain is 3 out of 10. He describes his pain as throbbing. His left shoulder pain does make it difficult for him to go to sleep and does still wake him up from sleep. The patient has been experiencing some bruising in his left shoulder after his fall. The patient has been taking Advil for his discomfort as needed. No Known Allergies    Current Outpatient Medications   Medication Sig    ergocalciferol, vitamin D2, (VITAMIN D2 PO) Take  by mouth. L.acid/L.casei/B.bif/B.salazar/FOS (PROBIOTIC BLEND PO) Take  by mouth. simvastatin (ZOCOR) 40 mg tablet     carBAMazepine (TEGretol) 100 mg chewable tablet Take 200 mg by mouth daily. No current facility-administered medications for this visit. Past Medical History:   Diagnosis Date    History of actinic keratoses     Seizure disorder ([facility] [address]     Shoulder fracture, right 2017    Skin cancer 2012    basal cell carcinoma-right nose,scalp        Past Surgical History:   Procedure Laterality Date    COLONOSCOPY N/A 2017    COLONOSCOPY performed by Paris Rodriguez MD at [facility] ENDOSCOPY    COLONOSCOPY N/A 2021    COLONOSCOPY   :- performed by Thao Griffin MD at [facility] ENDOSCOPY    [place]  2017    BCC vertex scalp by Dr. Elvie Morfin Left     shoulder surgery       History reviewed. No pertinent family history.      Social History     Socioeconomic History    Marital status:      Spouse name: Not on file    Number of children: Not on file    Years of education: Not on file    Highest education level: Not on file   Occupational History    Not on file   Tobacco Use    Smoking status: Former    Smokeless tobacco: Never   Substance and Sexual Activity    Alcohol use: Yes     Comment: \"socially\"    Drug use: Not on file    Sexual activity: Not on file   Other Topics Concern    Not on file   Social History Narrative    Not on file     Social Determinants of Health     Financial Resource Strain: Not on file   Food Insecurity: Not on file   Transportation Needs: Not on file   Physical Activity: Not on file   Stress: Not on file   Social Connections: Not on file   Intimate Partner Violence: Not on file   Housing Stability: Not on file       Review of Systems   All other systems reviewed and are negative. Vitals:  Ht 5' 10\" (1.778 m)   Wt 185 lb (83.9 kg)   BMI 26.54 kg/m²    Body mass index is 26.54 kg/m². Ortho Exam     The patient is well-developed and well-nourished. The patient presents today in alert and oriented x3 with a normal mood and affect. The patient stands with a normal weightbearing line and walks with a normal gait. Left shoulder/clavicle still has tenderness over the lateral aspect of his clavicle at the fracture site. His range of motion below shoulder level has improved since his last visit. His strength was again not tested today. His sensations are intact his pulses are 2+. His skin is normal.    ASSESSMENT/PLAN:      1. Nondisplaced fracture of lateral end of left clavicle, subsequent encounter for fracture with routine healing  -     XR CLAVICLE LT; Future  2. Pain of left clavicle     XR Results (most recent):  Results from Appointment encounter on 08/15/22    XR CLAVICLE LT    Narrative  Left clavicle 2 view x-rays show evidence of a previous nondisplaced nonangulated fracture of the lateral aspect of the clavicle. Below is the assessment and plan developed based on review of pertinent history, physical exam, labs, studies, and medications.     We discussed the patient's improving left shoulder/clavicle pain. He did suffer a nondisplaced and nonangulated fracture to the lateral aspect of his clavicle when he fell on the golf [place] on 7[place]30/2022. We did x-ray his left clavicle today and it shows the fracture in good alignment with no sign of angulation or displacement. We will continue to treat his pain with rest, ice, activity modification, and anti-inflammatory medication. He will continue the post fracture protocol by working on gentle range of motion, strengthening, and stretching exercises with an at-home exercise program as pain tolerates. He is to avoid any overhead activities and heavy lifting. I will see him back in 3 weeks for reevaluation and likely hermila-ray. Return in about 3 weeks (around 9/5/2022) for Reevaluation and further discussion of the post fracture protocol. An electronic signature was used to authenticate this note.   -- Gabby Miguel MD

## 2022-08-29 ENCOUNTER — OFFICE VISIT (OUTPATIENT)
Dept: ORTHOPEDIC SURGERY | Age: 77
End: 2022-08-29
Payer: MEDICARE

## 2022-08-29 VITALS — HEIGHT: 70 IN | WEIGHT: 185 LBS | BODY MASS INDEX: 26.48 KG/M2

## 2022-08-29 DIAGNOSIS — S42.035D NONDISPLACED FRACTURE OF LATERAL END OF LEFT CLAVICLE, SUBSEQUENT ENCOUNTER FOR FRACTURE WITH ROUTINE HEALING: ICD-10-CM

## 2022-08-29 DIAGNOSIS — M89.8X1 PAIN OF LEFT CLAVICLE: Primary | ICD-10-CM

## 2022-08-29 PROCEDURE — 99213 OFFICE O/P EST LOW 20 MIN: CPT | Performed by: ORTHOPAEDIC SURGERY

## 2022-08-29 PROCEDURE — 1101F PT FALLS ASSESS-DOCD LE1/YR: CPT | Performed by: ORTHOPAEDIC SURGERY

## 2022-08-29 PROCEDURE — 1123F ACP DISCUSS/DSCN MKR DOCD: CPT | Performed by: ORTHOPAEDIC SURGERY

## 2022-08-29 PROCEDURE — G8417 CALC BMI ABV UP PARAM F/U: HCPCS | Performed by: ORTHOPAEDIC SURGERY

## 2022-08-29 PROCEDURE — G8432 DEP SCR NOT DOC, RNG: HCPCS | Performed by: ORTHOPAEDIC SURGERY

## 2022-08-29 PROCEDURE — G8427 DOCREV CUR MEDS BY ELIG CLIN: HCPCS | Performed by: ORTHOPAEDIC SURGERY

## 2022-08-29 PROCEDURE — G8536 NO DOC ELDER MAL SCRN: HCPCS | Performed by: ORTHOPAEDIC SURGERY

## 2022-08-29 NOTE — PROGRESS NOTES
Rosalynd Gowers (: 1945) is a 65 y.o. male, patient, here for evaluation of the following chief complaint(s):  Clavicle pain (left)       HPI:    He was last seen for his left clavicle pain on 8/15/2022. The patient states his pain level is improved since his last visit. He rates the severity of his left clavicle pain is a 4 out of 10. He describes his pain as sharp and intermittent. He has been experiencing some bruising. The patient has been taking anti-inflammatory medication for his discomfort as needed. No Known Allergies    Current Outpatient Medications   Medication Sig    ergocalciferol, vitamin D2, (VITAMIN D2 PO) Take  by mouth. L.acid/L.casei/B.bif/B.salazar/FOS (PROBIOTIC BLEND PO) Take  by mouth. simvastatin (ZOCOR) 40 mg tablet     carBAMazepine (TEGretol) 100 mg chewable tablet Take 200 mg by mouth daily. No current facility-administered medications for this visit. Past Medical History:   Diagnosis Date    History of actinic keratoses     Seizure disorder ([facility] [address]     Shoulder fracture, right 2017    Skin cancer 2012    basal cell carcinoma-right nose,scalp        Past Surgical History:   Procedure Laterality Date    COLONOSCOPY N/A 2017    COLONOSCOPY performed by Deanna De León MD at [facility] ENDOSCOPY    COLONOSCOPY N/A 2021    COLONOSCOPY   :- performed by Lucita Carmichael MD at [facility] ENDOSCOPY    [place]  2017    BCC vertex scalp by Dr. Jacquie Orta Left     shoulder surgery       History reviewed. No pertinent family history.      Social History     Socioeconomic History    Marital status:      Spouse name: Not on file    Number of children: Not on file    Years of education: Not on file    Highest education level: Not on file   Occupational History    Not on file   Tobacco Use    Smoking status: Former    Smokeless tobacco: Never   Substance and Sexual Activity    Alcohol use: Yes     Comment: \"socially\"    Drug use: Not on file    Sexual activity: Not on file   Other Topics Concern    Not on file   Social History Narrative    Not on file     Social Determinants of Health     Financial Resource Strain: Not on file   Food Insecurity: Not on file   Transportation Needs: Not on file   Physical Activity: Not on file   Stress: Not on file   Social Connections: Not on file   Intimate Partner Violence: Not on file   Housing Stability: Not on file       Review of Systems   All other systems reviewed and are negative. Vitals:  Ht 5' 10\" (1.778 m)   Wt 185 lb (83.9 kg)   BMI 26.54 kg/m²    Body mass index is 26.54 kg/m². Ortho Exam     The patient is well-developed and well-nourished. The patient presents today in alert and oriented x3 with a normal mood and affect. The patient stands with a normal weightbearing line and walks with a normal gait. Left shoulder/clavicle still has some mild tenderness over the lateral aspect of his clavicle at the fracture site. He has essentially regained full shoulder range of motion. His strength was again not tested today. His sensations are intact his pulses are 2+. His skin is normal.      ASSESSMENT/PLAN:      1. Pain of left clavicle  -     XR CLAVICLE LT; Future  2. Nondisplaced fracture of lateral end of left clavicle, subsequent encounter for fracture with routine healing     XR Results (most recent):  Results from Appointment encounter on 08/29/22    XR CLAVICLE LT    Narrative  Left clavicle 2 view x-rays show evidence of a previous distal clavicle fracture with early signs of interval healing. There is minimal displacement. There is no change in position since his last x-ray. Below is the assessment and plan developed based on review of pertinent history, physical exam, labs, studies, and medications. We discussed the patient's left nondisplaced distal clavicle fracture. We did obtain x-rays today and it shows early signs of interval healing.   There is no displacement or angulation noted since his last x-ray. The patient will continue the post fracture protocol by slowly increasing activities as tolerated and as discussed today in the office. He is still to be cautious with any overhead activities and significant lifting. I did encourage him to continue to work on range of motion, strengthening, and stretching exercises with an at-home exercise program.  He will also ice when possible and use anti-inflammatory medication when necessary and modify his activity level based on his left shoulder pain. I will see him back in 2 to 3 weeks for reevaluation and likely hermila-ray. Return in about 3 weeks (around 9/19/2022) for Re-evaluation and further discussion of the post fracture protocol. An electronic signature was used to authenticate this note.   -- Lacey Padilla MD

## 2022-09-20 ENCOUNTER — OFFICE VISIT (OUTPATIENT)
Dept: ORTHOPEDIC SURGERY | Age: 77
End: 2022-09-20
Payer: MEDICARE

## 2022-09-20 VITALS — WEIGHT: 185 LBS | BODY MASS INDEX: 26.48 KG/M2 | HEIGHT: 70 IN

## 2022-09-20 DIAGNOSIS — S42.035D NONDISPLACED FRACTURE OF LATERAL END OF LEFT CLAVICLE, SUBSEQUENT ENCOUNTER FOR FRACTURE WITH ROUTINE HEALING: ICD-10-CM

## 2022-09-20 DIAGNOSIS — M89.8X1 CLAVICLE PAIN: Primary | ICD-10-CM

## 2022-09-20 PROCEDURE — G8417 CALC BMI ABV UP PARAM F/U: HCPCS | Performed by: ORTHOPAEDIC SURGERY

## 2022-09-20 PROCEDURE — 1101F PT FALLS ASSESS-DOCD LE1/YR: CPT | Performed by: ORTHOPAEDIC SURGERY

## 2022-09-20 PROCEDURE — 1123F ACP DISCUSS/DSCN MKR DOCD: CPT | Performed by: ORTHOPAEDIC SURGERY

## 2022-09-20 PROCEDURE — G8432 DEP SCR NOT DOC, RNG: HCPCS | Performed by: ORTHOPAEDIC SURGERY

## 2022-09-20 PROCEDURE — G8536 NO DOC ELDER MAL SCRN: HCPCS | Performed by: ORTHOPAEDIC SURGERY

## 2022-09-20 PROCEDURE — 99213 OFFICE O/P EST LOW 20 MIN: CPT | Performed by: ORTHOPAEDIC SURGERY

## 2022-09-20 PROCEDURE — G8427 DOCREV CUR MEDS BY ELIG CLIN: HCPCS | Performed by: ORTHOPAEDIC SURGERY

## 2022-09-20 NOTE — PROGRESS NOTES
Brigida Riggins (: 1945) is a 65 y.o. male, patient, here for evaluation of the following chief complaint(s):  Shoulder Pain (Left)       HPI:    He was last seen for his left shoulder/clavicle pain on 2022. The patient states that his pain level has improved since his last visit. He rates the severity of his left shoulder/clavicle pain is a 3 out of 10. He describes pain as aching. His discomfort does still wake him up from sleep at night. The patient has been taking anti-inflammatory medication for his discomfort as needed. No Known Allergies    Current Outpatient Medications   Medication Sig    ergocalciferol, vitamin D2, (VITAMIN D2 PO) Take  by mouth. L.acid/L.casei/B.bif/B.salazar/FOS (PROBIOTIC BLEND PO) Take  by mouth. simvastatin (ZOCOR) 40 mg tablet     carBAMazepine (TEGretol) 100 mg chewable tablet Take 200 mg by mouth daily. No current facility-administered medications for this visit. Past Medical History:   Diagnosis Date    History of actinic keratoses     Seizure disorder ([facility] [address]     Shoulder fracture, right 2017    Skin cancer 2012    basal cell carcinoma-right nose,scalp        Past Surgical History:   Procedure Laterality Date    COLONOSCOPY N/A 2017    COLONOSCOPY performed by Paris Rodriguez MD at [facility] ENDOSCOPY    COLONOSCOPY N/A 2021    COLONOSCOPY   :- performed by Thao Griffin MD at [facility] ENDOSCOPY    [place]  2017    BCC vertex scalp by Dr. Elvie Morfin Left     shoulder surgery       History reviewed. No pertinent family history.      Social History     Socioeconomic History    Marital status:      Spouse name: Not on file    Number of children: Not on file    Years of education: Not on file    Highest education level: Not on file   Occupational History    Not on file   Tobacco Use    Smoking status: Former    Smokeless tobacco: Never   Substance and Sexual Activity    Alcohol use: Yes     Comment: \"socially\"    Drug use: Not on file    Sexual activity: Not on file   Other Topics Concern    Not on file   Social History Narrative    Not on file     Social Determinants of Health     Financial Resource Strain: Not on file   Food Insecurity: Not on file   Transportation Needs: Not on file   Physical Activity: Not on file   Stress: Not on file   Social Connections: Not on file   Intimate Partner Violence: Not on file   Housing Stability: Not on file       Review of Systems   All other systems reviewed and are negative. Vitals:  Ht 5' 10\" (1.778 m)   Wt 185 lb (83.9 kg)   BMI 26.54 kg/m²    Body mass index is 26.54 kg/m². Ortho Exam     The patient is well-developed and well-nourished. The patient presents today in alert and oriented x3 with a normal mood and affect. The patient stands with a normal weightbearing line and walks with a normal gait. Left shoulder/clavicle still has some residual tenderness over the lateral aspect of his clavicle at the fracture site. He has essentially regained full shoulder range of motion. His strength has improved. There is still weakness noted compared to normal.  His sensations are intact his pulses are 2+. His skin is normal.      ASSESSMENT/PLAN:      1. Clavicle pain  -     XR CLAVICLE LT; Future  -     REFERRAL TO PHYSICAL THERAPY  2. Nondisplaced fracture of lateral end of left clavicle, subsequent encounter for fracture with routine healing     XR Results (most recent):  Results from Appointment encounter on 09/20/22    XR CLAVICLE LT    Narrative  Left clavicle 2 view x-rays show evidence of a previous minimally displaced midshaft clavicle fracture. There is no additional displacement or angulation noted since his last x-ray. Evidence of bone formation and interval healing at the fracture site. Below is the assessment and plan developed based on review of pertinent history, physical exam, labs, studies, and medications.     **The patient was referred to formal physical therapy. **    We discussed the patient's left minimally displaced distal clavicle fracture. We did obtain x-rays today and it shows signs of interval healing. There is no displacement or angulation noted since his last x-ray. The patient will continue the post fracture protocol by increasing activities as tolerated and as discussed today in the office. He is still to be cautious with any overhead activities and significant lifting. The patient was referred to formal physical therapy to work on range of motion, strengthening, and stretching exercises. He will also work on these exercises with an at-home exercise program as pain tolerates. He will also ice when possible and use anti-inflammatory medication when necessary and modify his activity level based on his left shoulder pain. I will see him back in 3 to 4 weeks for reevaluation if he has any continued persistence of his pain however, if his pain continues to improve and is well-maintained I will see him back on an as-needed basis. Return in about 4 weeks (around 10/18/2022), or if symptoms worsen or fail to improve. An electronic signature was used to authenticate this note.   -- Binh James MD

## 2022-09-30 ENCOUNTER — OFFICE VISIT (OUTPATIENT)
Dept: ORTHOPEDIC SURGERY | Age: 77
End: 2022-09-30
Payer: MEDICARE

## 2022-09-30 DIAGNOSIS — M54.50 LOW BACK PAIN AT MULTIPLE SITES: Primary | ICD-10-CM

## 2022-09-30 DIAGNOSIS — M89.8X1 CLAVICLE PAIN: ICD-10-CM

## 2022-09-30 DIAGNOSIS — S42.035D NONDISPLACED FRACTURE OF LATERAL END OF LEFT CLAVICLE, SUBSEQUENT ENCOUNTER FOR FRACTURE WITH ROUTINE HEALING: Primary | ICD-10-CM

## 2022-09-30 DIAGNOSIS — M51.36 DDD (DEGENERATIVE DISC DISEASE), LUMBAR: ICD-10-CM

## 2022-09-30 PROCEDURE — 97110 THERAPEUTIC EXERCISES: CPT | Performed by: PHYSICAL THERAPIST

## 2022-09-30 PROCEDURE — 97161 PT EVAL LOW COMPLEX 20 MIN: CPT | Performed by: PHYSICAL THERAPIST

## 2022-09-30 NOTE — PROGRESS NOTES
Stephan Munoz (: 1945) is a 65 y.o. Back Pain and Shoulder Pain       Patient Name: Stephan Munoz  Date:2022  : 1945  [x]  Patient  Verified  Payor: James Hurtado / Plan: [place] / Product Type: Medicare /    Sammy Schwartz MD  Total Treatment Time (min): 45  Total Timed Codes (min): 45  1:1 Treatment Time ( [address] only): 39  Visit #:  20      Treatment Area: Left shoulder/low back    ASSESSMENT/PLAN:  Below is the assessment and plan developed based on review of pertinent history, physical exam, labs, studies, and medications. Patient comes in today with a diagnosis of low back pain as well as left clavicle fracture and presents with physical therapy deficits including range of motion, strength, flexibility, mobility, and biomechanics. He will benefit from a physical therapy program to address above-mentioned deficits. Short-term goals: To become independent with today's prescribed home exercise program in 1 week. Long-term goals: To progress with a physical therapy program so that patient demonstrates functional left shoulder range of motion and strength allowing him to perform all daily tasks as well as transition back into a golf and weight training program without shoulder pain or limitation in the next 4 to 6 weeks. Additionally patient will progress with hip and back flexibility and strength allowing him to tolerate playing golf reporting less then 3/10 back and left hip pain in the next 4 to 6 weeks. Additionally, patient will score a clinically significant improvement of 15 percentage points on the shoulder pain and disability index in the next 4 to 6 weeks. Patient will be seen twice a week for up to 20 visits  with focus on progressive restoration of range of motion and strength, balance, and functional mobility. Therapeutic applications will include but are not limited to:Manual therapy, joint mobilization, myofascial release, therapeutic exercises. Modalities including ultrasound and electric stimulation heat and ice. Kinesiotape and Philip taping for joint reeducation and approximation of tissue for neuromuscular reeducation. 1. Nondisplaced fracture of lateral end of left clavicle, subsequent encounter for fracture with routine healing  2. Clavicle pain  3. DDD (degenerative disc disease), lumbar    Return in about 1 week (around 10/7/2022). SUBJECTIVE:  HPI  Patient reports improvement since left clavicle fracture 7 weeks ago. Recent x-rays show good callus formation. He still has some discomfort with reaching overhead and when rolling on his left side. He would like to return to activities such as golf and strength training at the gym. He also complains of some left low back and hip pain. He typically notices this after playing a round of golf then sitting and then trying to stand up. He complains of pain in his left hip and thigh that typically last for the rest of the day. Does have a history of lumbar DDD and left-sided sciatica. Patient is right-hand dominant. He still works as a . He would like to get back into working out 3 times a week and playing golf twice a week. OBJECTIVE:  Evaluation (20 minutes)  Patient comes in today demonstrating nonantalgic gait. He holds a slightly forward trunk and rounded shoulder posture. Cervical range of motion is globally restricted yet pain-free. He is neurologically intact throughout bilateral upper extremities. Lower Quarter scan reveals no deficits with strength or sensation throughout bilateral lower extremities. She is tighter with slump test on the left side with reproduction of some buttock and back pain. Negative with straight leg raise and discogenic testing. Relative flexibility restriction to left hamstring and left piriformis. Moderate flexibility restriction to bilateral hip flexor. Patient test fair with basic phase 1 core stabilization testing. Hypomobility throughout lumbar spine. Hypertonicity to lumbar paraspinal.  Relative tenderness to left sciatic notch. Left shoulder: Active shoulder flexion to 130 degrees. Active shoulder abduction to 110 degrees. Scapular dyskinesia. Passive internal and external rotation within normal limits. Patient has good strength with resisted rotator cuff testing. Mild impingement sign. Hypomobility to [facility] joint. Hypomobility to sternoclavicular joint. Mild tenderness distal third of clavicle. Shoulder pain and disability index    Total pain score 40%    Total disability score 19%    Total score 27%      Exercise(mins 25)  With today's interventions we initiated exercises for range of motion, strengthening, posture education, and flexibility for patient perform at home on a daily basis. Today's exercises include supine pelvic tilt with ball, hip flexor hang, hamstring stretch, supine resisted mid trap, wall slides, and LAT pulldown. An electronic signature was used to authenticate this note.   -- Guerita Bell, PT

## 2022-10-07 ENCOUNTER — OFFICE VISIT (OUTPATIENT)
Dept: ORTHOPEDIC SURGERY | Age: 77
End: 2022-10-07
Payer: MEDICARE

## 2022-10-07 DIAGNOSIS — S42.035D NONDISPLACED FRACTURE OF LATERAL END OF LEFT CLAVICLE, SUBSEQUENT ENCOUNTER FOR FRACTURE WITH ROUTINE HEALING: Primary | ICD-10-CM

## 2022-10-07 DIAGNOSIS — M89.8X1 CLAVICLE PAIN: ICD-10-CM

## 2022-10-07 DIAGNOSIS — M51.36 DDD (DEGENERATIVE DISC DISEASE), LUMBAR: ICD-10-CM

## 2022-10-07 PROCEDURE — 97110 THERAPEUTIC EXERCISES: CPT | Performed by: PHYSICAL THERAPIST

## 2022-10-07 PROCEDURE — 97140 MANUAL THERAPY 1/> REGIONS: CPT | Performed by: PHYSICAL THERAPIST

## 2022-10-09 NOTE — PROGRESS NOTES
Yuliet Pierce (: 1945) is a 65 y.o. Shoulder Pain and Back Pain       Patient Name: Yuliet Pierce  Date:10/9/2022  : 1945  [x]  Patient  Verified  Payor: Soy Flores / Plan: [place] MED[place] / Product Type: Medicare /    Ebony Rivera MD  Total Treatment Time (min): 45  Total Timed Codes (min): 45  1:1 Treatment Time ([facility] only): 39  Visit #: 1 of 20      Treatment Area: Left shoulder/low back    ASSESSMENT/PLAN:  Below is the assessment and plan developed based on review of pertinent history, physical exam, labs, studies, and medications. Patient did well today with progression of hip opening and core strengthening. Still some scapular dyskinesia with the left shoulder and weakness overhead. Continue with current plan of care and progress towards long-term goals. 1. Nondisplaced fracture of lateral end of left clavicle, subsequent encounter for fracture with routine healing  2. Clavicle pain  3. DDD (degenerative disc disease), lumbar    Return in about 4 days (around 10/11/2022). SUBJECTIVE:  HPI  Shoulder is doing better. He is able to play golf without shoulder pain. Does have some weakness overhead. Still complains of low back pain after golf and after transitioning from sit to stand although he feels the stretches are helpful. OBJECTIVE:    Manual (10 minutes)  Bilateral hamstring and knee-to-chest stretching. Lumbar rotational and gapping mobilization with patient in left side-lying. Exercise(mins 35)  Today's exercises include supine pelvic tilt with ball, hip flexor hang, hamstring stretch, supine resisted mid trap, wall slides, T band stretch at treadmill, resisted scaption, corner stretch, standing mid trap, sliders, UB E, and LAT pulldown. An electronic signature was used to authenticate this note.   -- Nj Pierson, PT

## 2022-10-14 ENCOUNTER — OFFICE VISIT (OUTPATIENT)
Dept: ORTHOPEDIC SURGERY | Age: 77
End: 2022-10-14
Payer: MEDICARE

## 2022-10-14 DIAGNOSIS — M51.36 DDD (DEGENERATIVE DISC DISEASE), LUMBAR: ICD-10-CM

## 2022-10-14 DIAGNOSIS — M89.8X1 CLAVICLE PAIN: ICD-10-CM

## 2022-10-14 DIAGNOSIS — S42.035D NONDISPLACED FRACTURE OF LATERAL END OF LEFT CLAVICLE, SUBSEQUENT ENCOUNTER FOR FRACTURE WITH ROUTINE HEALING: Primary | ICD-10-CM

## 2022-10-14 PROCEDURE — 97140 MANUAL THERAPY 1/> REGIONS: CPT | Performed by: PHYSICAL THERAPIST

## 2022-10-14 PROCEDURE — 97110 THERAPEUTIC EXERCISES: CPT | Performed by: PHYSICAL THERAPIST

## 2022-10-14 NOTE — PROGRESS NOTES
Gladis Nolasco (: 1945) is a 65 y.o. Shoulder Pain and Back Pain       Patient Name: Gladis Nolasco  Date:10/15/2022  : 1945  [x]  Patient  Verified  Payor: Brianda Odom / Plan: [place] MED[place]ARE PART [place] & [place] / Product Type: Medicare /    Shahzad Spivey MD  Total Treatment Time (min): 45  Total Timed Codes (min): 45  1:1 Treatment Time ( [address] only): 39  Visit #: 3 of 20      Treatment Area: Left shoulder/low back    ASSESSMENT/PLAN:  Below is the assessment and plan developed based on review of pertinent history, physical exam, labs, studies, and medications. Improving with overhead strength and scapular control. Hypomobility throughout lumbar spine. Patient's leg symptoms are consistent with stenosis. He is able to get some relief with standing posterior pelvic tilt. continue with current plan of care and progress towards long-term goals. 1. Nondisplaced fracture of lateral end of left clavicle, subsequent encounter for fracture with routine healing  2. Clavicle pain  3. DDD (degenerative disc disease), lumbar    Return in about 5 days (around 10/19/2022). SUBJECTIVE:  HPI  Complaining of right sciatic type symptoms. Shoulder still feels relatively weak with lifting activities. OBJECTIVE:    Manual (10 minutes)  Bilateral hamstring and knee-to-chest stretching. Lumbar rotational and gapping mobilization with patient in left side-lying. Exercise(mins 35)  Today's exercises include supine pelvic tilt with ball, hip flexor hang, hamstring stretch, supine resisted mid trap, wall slides, T band stretch at treadmill, resisted scaption, corner stretch, standing mid trap, sliders, UB E, and LAT pulldown. An electronic signature was used to authenticate this note.   -- Brittany Alcocer, PT

## 2022-11-01 ENCOUNTER — OFFICE VISIT (OUTPATIENT)
Dept: ORTHOPEDIC SURGERY | Age: 77
End: 2022-11-01
Payer: MEDICARE

## 2022-11-01 DIAGNOSIS — S42.035D NONDISPLACED FRACTURE OF LATERAL END OF LEFT CLAVICLE, SUBSEQUENT ENCOUNTER FOR FRACTURE WITH ROUTINE HEALING: Primary | ICD-10-CM

## 2022-11-01 DIAGNOSIS — M51.36 DDD (DEGENERATIVE DISC DISEASE), LUMBAR: ICD-10-CM

## 2022-11-01 DIAGNOSIS — M54.50 LOW BACK PAIN AT MULTIPLE SITES: ICD-10-CM

## 2022-11-01 DIAGNOSIS — M89.8X1 CLAVICLE PAIN: ICD-10-CM

## 2022-11-01 PROCEDURE — 97140 MANUAL THERAPY 1/> REGIONS: CPT | Performed by: PHYSICAL THERAPIST

## 2022-11-01 PROCEDURE — 97110 THERAPEUTIC EXERCISES: CPT | Performed by: PHYSICAL THERAPIST

## 2022-11-02 NOTE — PROGRESS NOTES
Melinda Pena (: 1945) is a 65 y.o. Back Pain       Patient Name: Melinda Pena  Date:2022  : 1945  [x]  Patient  Verified  Payor: Leon Broderick / Plan: [place] MEDICARE PART A & B / Product Type: Medicare /    Eusebio Royal MD  Total Treatment Time (min): 60  Total Timed Codes (min): 45  1:1 Treatment Time ( [address] only): 39  Visit #: 4 of 20      Treatment Area: Left shoulder/low back    ASSESSMENT/PLAN:  Below is the assessment and plan developed based on review of pertinent history, physical exam, labs, studies, and medications. Limited overhead end-range with capsular restriction. Still functionally weak at 90 degrees. Continue with current plan of care and progress towards long-term goals. 1. Nondisplaced fracture of lateral end of left clavicle, subsequent encounter for fracture with routine healing  2. Clavicle pain  3. DDD (degenerative disc disease), lumbar    Return in about 1 week (around 2022). SUBJECTIVE:  HPI  Doing better. Would like his shoulder to be stronger. OBJECTIVE:    Manual (15 minutes)  Bilateral hamstring and knee-to-chest stretching. Lumbar rotational and gapping mobilization with patient in left side-lying. Right [facility] with PROM,    Exercise(mins 35)  Today's exercises include supine pelvic tilt with ball, hip flexor hang, hamstring stretch, supine resisted mid trap, wall slides, T band stretch at treadmill, resisted scaption, corner stretch, standing mid trap, sliders, UB E, and LAT pulldown. Ice post tx(10 mins)    An electronic signature was used to authenticate this note.   -- Bee Bryant, PT
History/Exam/Medical Decision Making

## 2022-11-08 ENCOUNTER — OFFICE VISIT (OUTPATIENT)
Dept: ORTHOPEDIC SURGERY | Age: 77
End: 2022-11-08
Payer: MEDICARE

## 2022-11-08 DIAGNOSIS — M89.8X1 CLAVICLE PAIN: ICD-10-CM

## 2022-11-08 DIAGNOSIS — S42.035D NONDISPLACED FRACTURE OF LATERAL END OF LEFT CLAVICLE, SUBSEQUENT ENCOUNTER FOR FRACTURE WITH ROUTINE HEALING: Primary | ICD-10-CM

## 2022-11-08 PROCEDURE — 97110 THERAPEUTIC EXERCISES: CPT | Performed by: PHYSICAL THERAPIST

## 2022-11-08 PROCEDURE — 97140 MANUAL THERAPY 1/> REGIONS: CPT | Performed by: PHYSICAL THERAPIST

## 2022-11-16 ENCOUNTER — OFFICE VISIT (OUTPATIENT)
Dept: ORTHOPEDIC SURGERY | Age: 77
End: 2022-11-16
Payer: MEDICARE

## 2022-11-16 DIAGNOSIS — M54.50 LOW BACK PAIN AT MULTIPLE SITES: ICD-10-CM

## 2022-11-16 DIAGNOSIS — M89.8X1 CLAVICLE PAIN: ICD-10-CM

## 2022-11-16 DIAGNOSIS — S42.035D NONDISPLACED FRACTURE OF LATERAL END OF LEFT CLAVICLE, SUBSEQUENT ENCOUNTER FOR FRACTURE WITH ROUTINE HEALING: Primary | ICD-10-CM

## 2022-11-16 PROCEDURE — 97140 MANUAL THERAPY 1/> REGIONS: CPT | Performed by: PHYSICAL THERAPIST

## 2022-11-16 PROCEDURE — 97110 THERAPEUTIC EXERCISES: CPT | Performed by: PHYSICAL THERAPIST

## 2022-11-16 NOTE — PROGRESS NOTES
Melinda Pena (: 1945) is a 65 y.o. Back Pain and Shoulder Pain       Patient Name: Melinda Pena  Date:2022  : 1945  [x]  Patient  Verified  Payor: Leon Broderick / Plan: [place] MEDICARE PART A & B / Product Type: Medicare /    Eusebio Royal MD  Total Treatment Time (min): 60  Total Timed Codes (min): 45  1:1 Treatment Time ( [address] only): 39  Visit #: 6 of 20      Treatment Area: Left shoulder/low back    ASSESSMENT/PLAN:  Below is the assessment and plan developed based on review of pertinent history, physical exam, labs, studies, and medications. Patient is benefiting from improved hip flexibility and core strengthening. Left shoulder still weak above shoulder level but improving with scapular stability and arthrokinematic's. Continue with current plan of care and progress towards long-term goals. 1. Nondisplaced fracture of lateral end of left clavicle, subsequent encounter for fracture with routine healing  2. Clavicle pain  3. Low back pain at multiple sites    Return in about 1 week (around 2022). SUBJECTIVE:  HPI  Doing better. Back has been less bothersome after playing golf. OBJECTIVE:    Manual (15 minutes)  Bilateral hamstring/IT band and knee-to-chest stretching with pt in supine. Exercise(mins 30)  Today's exercises include supine pelvic tilt with ball, hip flexor hang, hamstring stretch, supine resisted mid trap, wall slides, dynamic IT band stretch at treadmill, resisted scaption, corner stretch, rooster walk, standing mid trap, sliders, and LAT pulldown. Ice post tx(10 mins)    An electronic signature was used to authenticate this note.   -- Bee Bryant, PT

## 2022-11-30 ENCOUNTER — OFFICE VISIT (OUTPATIENT)
Dept: ORTHOPEDIC SURGERY | Age: 77
End: 2022-11-30
Payer: MEDICARE

## 2022-11-30 DIAGNOSIS — M89.8X1 CLAVICLE PAIN: ICD-10-CM

## 2022-11-30 DIAGNOSIS — M51.36 DDD (DEGENERATIVE DISC DISEASE), LUMBAR: ICD-10-CM

## 2022-11-30 DIAGNOSIS — S42.035D NONDISPLACED FRACTURE OF LATERAL END OF LEFT CLAVICLE, SUBSEQUENT ENCOUNTER FOR FRACTURE WITH ROUTINE HEALING: Primary | ICD-10-CM

## 2022-12-01 NOTE — PROGRESS NOTES
Gladis Nolasco (: 1945) is a 65 y.o. Back Pain and Shoulder Pain       Patient Name: Gladis Nolasco  Date:2022  : 1945  [x]  Patient  Verified  Payor: Brianda Odom / Plan: [place] MEDICARE PART A & B / Product Type: Medicare /    Shahzad Spivey MD  Total Treatment Time (min): 55  Total Timed Codes (min): 45  1:1 Treatment Time ( [address] only): 39  Visit #: 6 of 20      Treatment Area: Left shoulder/low back    ASSESSMENT/PLAN:  Below is the assessment and plan developed based on review of pertinent history, physical exam, labs, studies, and medications. Patient has a history of falls with injuries to his shoulders and clavicle. We worked today with some balance strategies. He has a tendency to take shorter steps and has difficulty with weight shift and balance exercises. We will have him work on this at home. Well today with both shoulder and back exercises. Continue with current plan of care and progress towards long-term goals. 1. Nondisplaced fracture of lateral end of left clavicle, subsequent encounter for fracture with routine healing  2. Clavicle pain  3. DDD (degenerative disc disease), lumbar    Return in about 5 days (around 2022). SUBJECTIVE:  Lost his balance and scraped his head in the shower. He feels his balance is an issue and is worried about falling. OBJECTIVE:    Manual (10 minutes)  Bilateral hamstring/IT band and knee-to-chest stretching with pt in supine. Exercise(mins 35)  Today's exercises include supine pelvic tilt with ball, hip flexor hang, hamstring stretch, supine resisted mid trap, wall slides, dynamic IT band stretch at treadmill, padded weight shift, ladder gait training, balance board, resisted scaption, corner stretch, rooster walk, standing mid trap, sliders, and LAT pulldown. Ice post tx(10 mins)    An electronic signature was used to authenticate this note.   -- Brittany Alcocer, PT

## 2022-12-20 ENCOUNTER — OFFICE VISIT (OUTPATIENT)
Dept: ORTHOPEDIC SURGERY | Age: 77
End: 2022-12-20
Payer: MEDICARE

## 2022-12-20 DIAGNOSIS — M89.8X1 CLAVICLE PAIN: ICD-10-CM

## 2022-12-20 DIAGNOSIS — M54.50 LOW BACK PAIN AT MULTIPLE SITES: ICD-10-CM

## 2022-12-20 DIAGNOSIS — M51.36 DDD (DEGENERATIVE DISC DISEASE), LUMBAR: Primary | ICD-10-CM

## 2022-12-20 NOTE — PROGRESS NOTES
Yuliet Pierce (: 1945) is a 65 y.o. Shoulder Pain and Back Pain       Patient Name: Yuliet Pierce  Date:2022  : 1945  [x]  Patient  Verified  Payor: Soy Mark / Plan: [place] MED[place] / Product Type: Medicare /    Ebony Rivera MD  Total Treatment Time (min): 55  Total Timed Codes (min): 45  1:1 Treatment Time ( [address] only): 39  Visit #: 7 of 20      Treatment Area: Left shoulder/low back    ASSESSMENT/PLAN:  Below is the assessment and plan developed based on review of pertinent history, physical exam, labs, studies, and medications. We worked extensively today with gait and balance training. He does improve with repetition. I have encouraged him to work on this either in an outside yoga or balance class or with supervision at home. Tolerates today's exercises without indication of back or leg pain. Continue with current plan of care progress towards long-term goals. 1. DDD (degenerative disc disease), lumbar  2. Low back pain at multiple sites  3. Clavicle pain    Return in about 1 week (around 2022). SUBJECTIVE:  Lost his balance and scraped his head in the shower. He feels his balance is an issue and is worried about falling. OBJECTIVE:    Manual (10 minutes)  Bilateral hamstring/IT band and knee-to-chest stretching with pt in supine. Left lower extremity long axis traction. Patient was positioned in sacral hang position with instrument assisted soft tissue massage to left lumbar paraspinal and gluteal region. Exercise(mins 35)  Today's exercises include supine pelvic tilt with ball, hip flexor stool stretch, hamstring stretch, supine resisted mid trap, wall slides, dynamic IT band stretch at treadmill, ladder gait training, padded weight shift, ladder gait training, balance board, resisted scaption, corner stretch, cup Walk, standing mid trap, sliders, and LAT pulldown.     Ice post tx(10 mins)    An electronic signature was used to [employer] this note.   -- Luis Way, PT

## 2023-01-04 ENCOUNTER — OFFICE VISIT (OUTPATIENT)
Dept: ORTHOPEDIC SURGERY | Age: 78
End: 2023-01-04
Payer: MEDICARE

## 2023-01-04 DIAGNOSIS — M89.8X1 CLAVICLE PAIN: ICD-10-CM

## 2023-01-04 DIAGNOSIS — S42.035D NONDISPLACED FRACTURE OF LATERAL END OF LEFT CLAVICLE, SUBSEQUENT ENCOUNTER FOR FRACTURE WITH ROUTINE HEALING: ICD-10-CM

## 2023-01-04 DIAGNOSIS — M51.36 DDD (DEGENERATIVE DISC DISEASE), LUMBAR: Primary | ICD-10-CM

## 2023-01-04 PROCEDURE — 97140 MANUAL THERAPY 1/> REGIONS: CPT | Performed by: PHYSICAL THERAPIST

## 2023-01-04 PROCEDURE — 97112 NEUROMUSCULAR REEDUCATION: CPT | Performed by: PHYSICAL THERAPIST

## 2023-01-04 NOTE — PROGRESS NOTES
Jeb Resendez (: 1945) is a 65 y.o. Shoulder Pain and Back Pain       Patient Name: Jeb Resendez  Date:2023  : 1945  [x]  Patient  Verified  Payor: Lisy Mendesamada / Plan: [place] MEDICARE PART A & B / Product Type: Medicare /    Jackie Snowden MD  Total Treatment Time (min): 60  Total Timed Codes (min): 45  1:1 Treatment Time ( [address] only): 39  Visit #: 7 of 20      Treatment Area: Left shoulder/low back    ASSESSMENT/PLAN:  Below is the assessment and plan developed based on review of pertinent history, physical exam, labs, studies, and medications. Patient complains of more back pain this week. This is likely coming from more sitting. We went over additional stretches for opening up his hip flexors which she will add to his home exercise routine. Still needs to improve shoulder strength above 90 degrees. Continue with current plan of care progress towards long-term goals. 1. DDD (degenerative disc disease), lumbar  2. Clavicle pain  3. Nondisplaced fracture of lateral end of left clavicle, subsequent encounter for fracture with routine healing    Return in about 1 week (around 2023). SUBJECTIVE:  Back has been more bothersome in the past several days. OBJECTIVE:    Manual (15 minutes)  Bilateral hamstring/IT band and knee-to-chest stretching with pt in supine. Left lower extremity long axis traction. Patient was positioned in sacral hang position with instrument assisted soft tissue massage to left lumbar paraspinal and gluteal region. Patient positioned in right side-lying with lumbar gapping and sacral float techniques.       Exercise(mins 35)  Today's exercises include supine pelvic tilt with ball, hip flexor stretch and knee, modified bridge, hamstring stretch, supine resisted mid trap, supine resisted scaption, wall slides, dynamic IT band stretch at treadmill, ladder gait training, padded weight shift, ladder gait training, balance board, resisted scaption, corner stretch, cup Walk, supine mid trap, sliders, and LAT pulldown. Ice post tx(10 mins)    An electronic signature was used to authenticate this note.   -- Jacquie Blair, PT

## 2023-01-17 ENCOUNTER — OFFICE VISIT (OUTPATIENT)
Dept: ORTHOPEDIC SURGERY | Age: 78
End: 2023-01-17
Payer: MEDICARE

## 2023-01-17 DIAGNOSIS — M51.36 DDD (DEGENERATIVE DISC DISEASE), LUMBAR: Primary | ICD-10-CM

## 2023-01-17 DIAGNOSIS — M89.8X1 CLAVICLE PAIN: ICD-10-CM

## 2023-01-17 PROCEDURE — 97110 THERAPEUTIC EXERCISES: CPT | Performed by: PHYSICAL THERAPIST

## 2023-01-17 PROCEDURE — 97140 MANUAL THERAPY 1/> REGIONS: CPT | Performed by: PHYSICAL THERAPIST

## 2023-01-17 NOTE — PROGRESS NOTES
Madhu Pires (: 1945) is a 65 y.o. Back Pain       Patient Name: Madhu Pires  Date:2023  : 1945  [x]  Patient  Verified  Payor: Sharlyne Schaumann / Plan: [place] MEDICARE PART A & B / Product Type: Medicare /    Francoise Claros MD  Total Treatment Time (min): 60  Total Timed Codes (min): 45  1:1 Treatment Time ( [address] only): 39  Visit #: 7 of 20      Treatment Area: Left shoulder/low back    ASSESSMENT/PLAN:  Below is the assessment and plan developed based on review of pertinent history, physical exam, labs, studies, and medications. Patient needs to work more on balance. We have updated his home exercise program.  Recommend warming up with stationary bike before playing golf. Continue with current plan of care progress towards long-term goals. 1. DDD (degenerative disc disease), lumbar  2. Clavicle pain    Return in about 1 week (around 2023). SUBJECTIVE:  Having some right hip pain after playing golf, sitting for period of time and then trying to stand up. OBJECTIVE:    Manual (15 minutes)  Bilateral hamstring/IT band and knee-to-chest stretching with pt in supine. Left lower extremity long axis traction. Patient was positioned in sacral hang position with instrument assisted soft tissue massage to left lumbar paraspinal and gluteal region. Patient positioned in right side-lying with lumbar gapping and sacral float techniques. Exercise(mins 30)  Today's exercises include supine pelvic tilt with ball, hip flexor stretch and knee, modified bridge, hamstring stretch, dynamic IT band stretch at treadmill, ladder gait training, padded weight shift, ladder gait training, balance board, resisted scaption, corner stretch, cup Walk, supine mid trap, sliders, and LAT pulldown. Ice post tx(10 mins)    An electronic signature was used to authenticate this note.   -- Deny Davis, PT

## 2023-01-24 ENCOUNTER — OFFICE VISIT (OUTPATIENT)
Dept: ORTHOPEDIC SURGERY | Age: 78
End: 2023-01-24
Payer: MEDICARE

## 2023-01-24 DIAGNOSIS — M51.36 DDD (DEGENERATIVE DISC DISEASE), LUMBAR: Primary | ICD-10-CM

## 2023-01-24 DIAGNOSIS — S42.035D NONDISPLACED FRACTURE OF LATERAL END OF LEFT CLAVICLE, SUBSEQUENT ENCOUNTER FOR FRACTURE WITH ROUTINE HEALING: ICD-10-CM

## 2023-01-24 PROCEDURE — 97110 THERAPEUTIC EXERCISES: CPT | Performed by: PHYSICAL THERAPIST

## 2023-01-24 PROCEDURE — 97140 MANUAL THERAPY 1/> REGIONS: CPT | Performed by: PHYSICAL THERAPIST

## 2023-01-24 NOTE — PROGRESS NOTES
Nolan Rose (: 1945) is a 65 y.o. Shoulder Pain and Back Pain       Patient Name: Nolan Rose  Date:2023  : 1945  [x]  Patient  Verified  Payor: Jo Ann Alvares / Plan: [place] MEDICARE PART A & B / Product Type: Medicare /    Don Del Real MD  Total Treatment Time (min): 60  Total Timed Codes (min): 45  1:1 Treatment Time ( [address] only): 39  Visit #: 7 of 20      Treatment Area: Left shoulder/low back    ASSESSMENT/PLAN:  Below is the assessment and plan developed based on review of pertinent history, physical exam, labs, studies, and medications. Better balance demonstrated with weight shifting and single-leg stance. Still needs to be more consistent at home. We reviewed balance exercises that he can easily replicate at home. Continue with current plan of care progress towards long-term goals. 1. DDD (degenerative disc disease), lumbar  2. Nondisplaced fracture of lateral end of left clavicle, subsequent encounter for fracture with routine healing    Return in about 1 week (around 2023). SUBJECTIVE:  Has not played golf since last treatment so back is feeling okay. Feels like the balance exercises are helping but unsure how he can practice this on his own. OBJECTIVE:    Manual (15 minutes)  Bilateral hamstring/IT band and knee-to-chest stretching with pt in supine. Left lower extremity long axis traction. Patient was positioned in sacral hang position with instrument assisted soft tissue massage to left lumbar paraspinal and gluteal region. Exercise(mins 30)  Today's exercises include supine pelvic tilt with ball, hip flexor stretch and knee, modified bridge, hamstring stretch, dynamic IT band stretch at treadmill, ladder gait training, padded weight shift, ladder gait training, balance board, resisted scaption, corner stretch, cup Walk, forward weight shift, supine mid trap, sliders, and LAT pulldown.     Ice post tx(15 mins)    An electronic signature was used to authenticate this note.   -- Chelsea Ryan, PT

## 2023-01-31 ENCOUNTER — OFFICE VISIT (OUTPATIENT)
Dept: ORTHOPEDIC SURGERY | Age: 78
End: 2023-01-31
Payer: MEDICARE

## 2023-01-31 DIAGNOSIS — M51.36 DDD (DEGENERATIVE DISC DISEASE), LUMBAR: Primary | ICD-10-CM

## 2023-01-31 DIAGNOSIS — M54.50 LOW BACK PAIN AT MULTIPLE SITES: ICD-10-CM

## 2023-01-31 PROCEDURE — 97110 THERAPEUTIC EXERCISES: CPT | Performed by: PHYSICAL THERAPIST

## 2023-01-31 PROCEDURE — 97140 MANUAL THERAPY 1/> REGIONS: CPT | Performed by: PHYSICAL THERAPIST

## 2023-01-31 NOTE — PROGRESS NOTES
Naseem Perez (: 1945) is a 65 y.o. No chief complaint on file. Patient Name: Naseem Perez  Date:2023  : 1945  [x]  Patient  Verified  Payor: Tray Bryson / Plan: [place] MEDICARE PART A & B / Product Type: Medicare /    Mel Martinez MD  Total Treatment Time (min): 60  Total Timed Codes (min): 45  1:1 Treatment Time ( [address] only): 39  Visit #: 8 of 20      Treatment Area: Left shoulder/low back    ASSESSMENT/PLAN:  Below is the assessment and plan developed based on review of pertinent history, physical exam, labs, studies, and medications. Improving with balance training. At home compliance helping with this. Continue with current plan of care and progress towards long-term goals. 1. DDD (degenerative disc disease), lumbar  2. Low back pain at multiple sites    Return in about 1 week (around 2023). SUBJECTIVE:  Balance seems better. OBJECTIVE:    Manual (15 minutes)  Bilateral hamstring/IT band and knee-to-chest stretching with pt in supine. Right piriformis stretch with ms energy. Left lower extremity long axis traction. Exercise(mins 30)  Today's exercises include supine pelvic tilt with ball, hip flexor stretch and knee, modified bridge, dynamic IT band stretch at treadmill, ladder gait training, padded weight shift, balance board, resisted scaption, corner stretch, cup Walk, forward weight shift, supine mid trap, sliders, and LAT pulldown. Ice post tx(15 mins)    An electronic signature was used to authenticate this note.   -- Tristan Sumner, PT

## 2023-02-08 ENCOUNTER — OFFICE VISIT (OUTPATIENT)
Dept: ORTHOPEDIC SURGERY | Age: 78
End: 2023-02-08
Payer: MEDICARE

## 2023-02-08 DIAGNOSIS — M54.50 LOW BACK PAIN AT MULTIPLE SITES: ICD-10-CM

## 2023-02-08 DIAGNOSIS — M51.36 DDD (DEGENERATIVE DISC DISEASE), LUMBAR: Primary | ICD-10-CM

## 2023-02-09 NOTE — PROGRESS NOTES
Stacy Garcia (: 1945) is a 65 y.o. Shoulder Pain and Back Pain       Patient Name: Stacy Garcia  : 1945  [x]  Patient  Verified  Payor: Freddyfrancoise John / Plan: [place] MEDICARE PART A & B / Product Type: Medicare /    Yessenia Fernandez MD  Total Treatment Time (min): 60  Total Timed Codes (min): 45  1:1 Treatment Time ( [address] only): 39  Visit #: 9 of 20      Treatment Area: Left shoulder/low back    ASSESSMENT/PLAN:  Below is the assessment and plan developed based on review of pertinent history, physical exam, labs, studies, and medications. Subjective complaints consistent with stenosis. Continues to do better with balance and gait training. Continue with current plan of care and progress towards long-term goals. 1. DDD (degenerative disc disease), lumbar  2. Low back pain at multiple sites    Return in about 1 week (around 2/15/2023). SUBJECTIVE:  Went to a meeting in [place] over the weekend and was walking more than normal.  Was having pain on both sides of his back and buttock area and needed to sit down after a time. OBJECTIVE:    Manual (15 minutes)  Bilateral hamstring/IT band and knee-to-chest stretching with pt in supine. Bilateral piriformis stretch with ms energy. Left lower extremity long axis traction. Exercise(mins 30)  Today's exercises include supine pelvic tilt with ball, hip flexor stretch and knee, modified bridge, posterior pelvic tilt, dynamic IT band stretch at treadmill, ladder gait training, padded weight shift, balance board, resisted scaption, corner stretch, cup Walk, forward weight shift, supine mid trap, sliders, and LAT pulldown. Ice post tx(15 mins)    An electronic signature was used to authenticate this note.   -- Kia Mora, PT

## 2024-03-24 ENCOUNTER — HOSPITAL ENCOUNTER (INPATIENT)
Facility: HOSPITAL | Age: 79
LOS: 1 days | Discharge: HOME | End: 2024-03-25
Attending: STUDENT IN AN ORGANIZED HEALTH CARE EDUCATION/TRAINING PROGRAM | Admitting: HOSPITALIST
Payer: MEDICARE

## 2024-03-24 ENCOUNTER — APPOINTMENT (OUTPATIENT)
Facility: HOSPITAL | Age: 79
End: 2024-03-24
Payer: MEDICARE

## 2024-03-24 DIAGNOSIS — R47.81 SLURRED SPEECH: ICD-10-CM

## 2024-03-24 DIAGNOSIS — R55 SYNCOPE AND COLLAPSE: Primary | ICD-10-CM

## 2024-03-24 LAB
ALBUMIN SERPL-MCNC: 3.7 G/DL (ref 3.5–5)
ALBUMIN/GLOB SERPL: 1.2 (ref 1.1–2.2)
ALP SERPL-CCNC: 132 U/L (ref 45–117)
ALT SERPL-CCNC: 20 U/L (ref 12–78)
ANION GAP SERPL CALC-SCNC: 11 MMOL/L (ref 5–15)
AST SERPL-CCNC: 22 U/L (ref 15–37)
BASOPHILS # BLD: 0 K/UL (ref 0–0.1)
BASOPHILS NFR BLD: 1 % (ref 0–1)
BILIRUB SERPL-MCNC: 0.3 MG/DL (ref 0.2–1)
BUN SERPL-MCNC: 15 MG/DL (ref 6–20)
BUN/CREAT SERPL: 14 (ref 12–20)
CALCIUM SERPL-MCNC: 8.8 MG/DL (ref 8.5–10.1)
CARBAMAZEPINE SERPL-MCNC: 11.4 UG/ML (ref 4–12)
CHLORIDE SERPL-SCNC: 107 MMOL/L (ref 97–108)
CO2 SERPL-SCNC: 27 MMOL/L (ref 21–32)
CREAT SERPL-MCNC: 1.04 MG/DL (ref 0.7–1.3)
DIFFERENTIAL METHOD BLD: NORMAL
EKG ATRIAL RATE: 53 BPM
EKG DIAGNOSIS: NORMAL
EKG P AXIS: 66 DEGREES
EKG P-R INTERVAL: 180 MS
EKG Q-T INTERVAL: 448 MS
EKG QRS DURATION: 88 MS
EKG QTC CALCULATION (BAZETT): 420 MS
EKG R AXIS: -8 DEGREES
EKG T AXIS: -23 DEGREES
EKG VENTRICULAR RATE: 53 BPM
EOSINOPHIL # BLD: 0.1 K/UL (ref 0–0.4)
EOSINOPHIL NFR BLD: 1 % (ref 0–7)
ERYTHROCYTE [DISTWIDTH] IN BLOOD BY AUTOMATED COUNT: 13 % (ref 11.5–14.5)
GLOBULIN SER CALC-MCNC: 3.1 G/DL (ref 2–4)
GLUCOSE BLD STRIP.AUTO-MCNC: 112 MG/DL (ref 65–117)
GLUCOSE SERPL-MCNC: 132 MG/DL (ref 65–100)
HCT VFR BLD AUTO: 41.8 % (ref 36.6–50.3)
HGB BLD-MCNC: 14 G/DL (ref 12.1–17)
IMM GRANULOCYTES # BLD AUTO: 0 K/UL (ref 0–0.04)
IMM GRANULOCYTES NFR BLD AUTO: 0 % (ref 0–0.5)
LYMPHOCYTES # BLD: 2.8 K/UL (ref 0.8–3.5)
LYMPHOCYTES NFR BLD: 39 % (ref 12–49)
MCH RBC QN AUTO: 31.3 PG (ref 26–34)
MCHC RBC AUTO-ENTMCNC: 33.5 G/DL (ref 30–36.5)
MCV RBC AUTO: 93.3 FL (ref 80–99)
MONOCYTES # BLD: 0.7 K/UL (ref 0–1)
MONOCYTES NFR BLD: 10 % (ref 5–13)
NEUTS SEG # BLD: 3.5 K/UL (ref 1.8–8)
NEUTS SEG NFR BLD: 49 % (ref 32–75)
NRBC # BLD: 0 K/UL (ref 0–0.01)
NRBC BLD-RTO: 0 PER 100 WBC
PLATELET # BLD AUTO: 183 K/UL (ref 150–400)
PMV BLD AUTO: 10.3 FL (ref 8.9–12.9)
POTASSIUM SERPL-SCNC: 3.5 MMOL/L (ref 3.5–5.1)
PROT SERPL-MCNC: 6.8 G/DL (ref 6.4–8.2)
RBC # BLD AUTO: 4.48 M/UL (ref 4.1–5.7)
SERVICE CMNT-IMP: NORMAL
SODIUM SERPL-SCNC: 145 MMOL/L (ref 136–145)
TROPONIN I SERPL HS-MCNC: 8 NG/L (ref 0–76)
WBC # BLD AUTO: 7.1 K/UL (ref 4.1–11.1)

## 2024-03-24 PROCEDURE — 84484 ASSAY OF TROPONIN QUANT: CPT

## 2024-03-24 PROCEDURE — 36415 COLL VENOUS BLD VENIPUNCTURE: CPT

## 2024-03-24 PROCEDURE — 93005 ELECTROCARDIOGRAM TRACING: CPT | Performed by: STUDENT IN AN ORGANIZED HEALTH CARE EDUCATION/TRAINING PROGRAM

## 2024-03-24 PROCEDURE — 70450 CT HEAD/BRAIN W/O DYE: CPT

## 2024-03-24 PROCEDURE — 85025 COMPLETE CBC W/AUTO DIFF WBC: CPT

## 2024-03-24 PROCEDURE — 80053 COMPREHEN METABOLIC PANEL: CPT

## 2024-03-24 PROCEDURE — 2060000000 HC ICU INTERMEDIATE R&B

## 2024-03-24 PROCEDURE — 82962 GLUCOSE BLOOD TEST: CPT

## 2024-03-24 PROCEDURE — 71045 X-RAY EXAM CHEST 1 VIEW: CPT

## 2024-03-24 PROCEDURE — 99285 EMERGENCY DEPT VISIT HI MDM: CPT

## 2024-03-24 PROCEDURE — 6360000004 HC RX CONTRAST MEDICATION: Performed by: STUDENT IN AN ORGANIZED HEALTH CARE EDUCATION/TRAINING PROGRAM

## 2024-03-24 PROCEDURE — 70498 CT ANGIOGRAPHY NECK: CPT

## 2024-03-24 PROCEDURE — 80156 ASSAY CARBAMAZEPINE TOTAL: CPT

## 2024-03-24 RX ADMIN — IOPAMIDOL 100 ML: 755 INJECTION, SOLUTION INTRAVENOUS at 16:22

## 2024-03-24 ASSESSMENT — PAIN SCALES - GENERAL: PAINLEVEL_OUTOF10: 0

## 2024-03-24 NOTE — ED PROVIDER NOTES
[facility]  EMERGENCY DEPARTMENT ENCOUNTER      Pt Name: Juarez Quiles  MRN: [id number]  Birthdate 1945  Date of evaluation: 3/24/2024  Provider: Jesse Frank DO    CHIEF COMPLAINT       Chief Complaint   Patient presents with    Loss of Consciousness         HISTORY OF PRESENT ILLNESS   (Location/Symptom, Timing/Onset, Context/Setting, Quality, Duration, Modifying Factors, Severity)  Note limiting factors.   78yo male brought by EMS for syncopal episode happening at 14:30. Patient was hitting gold balls with his grandson and had a syncopal episode. No preceding symptoms. Woke up afterwards and is now having garbled speech. Denies any weakness, numbness. No anticoagulant use. No history of stroke.            Review of External Medical Records:     Nursing Notes were reviewed.    REVIEW OF SYSTEMS    (2-9 systems for level 4, 10 or more for level 5)     Review of Systems    Except as noted above the remainder of the review of systems was reviewed and negative.       PAST MEDICAL HISTORY     Past Medical History:   Diagnosis Date    History of actinic keratoses     Seizure disorder (HCC)     Shoulder fracture, right 2017    Skin cancer 9/17/2012    basal cell carcinoma-right nose,scalp         SURGICAL HISTORY       Past Surgical History:   Procedure Laterality Date    COLONOSCOPY N/A 8/14/2017    COLONOSCOPY performed by Fredis Banks MD at [facility]    COLONOSCOPY N/A 6/1/2021    COLONOSCOPY   :- performed by Blake Badillo MD at [facility]    MOHS SURGERY  06/27/2017    BCC vertex scalp by Dr. iSerra     OTHER SURGICAL HISTORY Left     shoulder surgery         CURRENT MEDICATIONS       Previous Medications    CARBAMAZEPINE (TEGRETOL) 100 MG CHEWABLE TABLET    Take 200 mg by mouth daily    SIMVASTATIN (ZOCOR) 40 MG TABLET    ceived the following from [facility]: Outside name: simvastatin (ZOCOR) 40 mg tablet       ALLERGIES     Patient has no known  Tympanic Tympanic    SpO2: 96%  98%   Weight: 87 kg (191 lb 12.8 oz)     Height: 1.778 m (5' 10\")             Medical Decision Making  Amount and/or Complexity of Data Reviewed  Labs: ordered.  Radiology: ordered.  ECG/medicine tests: ordered.    Risk  Prescription drug management.            REASSESSMENT     ED Course as of 03/24/24 1639   Sun Mar 24, 2024   1513 Spoke to teleneurologist Dr. Allison. Agrees with garbled speech. Technically NIHSS of zero. Recommended tegretol level, and further workup. [WG]   1518 EKG 12 Lead  Sinus bradycardia, rate 53.  Left axis deviation.  Flipped T waves laterally.  No STEMI, artifact in lead V3 limits interpretation. [AS]      ED Course User Index  [AS] Gaurang Nichols MD  [WG] Jesse Frank DO [facility] for Admission  4:39 PM    ED Room Number: SER10/10  Patient Name and age:  Juarez Quiles 79 y.o.  male  Working Diagnosis:   1. Syncope and collapse    2. Slurred speech        COVID-19 Suspicion: No  Sepsis present:  No  Reassessment needed: No  Code Status:  Full Code  Readmission: No  Isolation Requirements: no  Recommended Level of Care: telemetry  Department: [place] [facility] - [phone]  Consulting Provider: Dr. Finney    Other:  79-year-old male needs admission for syncopal episode and slurred speech.  Patient brought in after a syncopal episode while with grandkids and playing golf.  He came in with garbled speech, was activated as a level 1 stroke alert was seen by teleneurology who does not believe that this is a stroke.  He has had negative imaging.  Does have history of seizures, Tegretol level has been ordered, otherwise reassuring laboratory studies,     Total critical care time spent exclusive of procedures:  40 minutes.         CONSULTS:  IP CONSULT TO TELE-NEUROLOGY  IP CONSULT TO HOSPITALIST    PROCEDURES:  Unless otherwise noted below, none     Procedures      FINAL IMPRESSION      1. Syncope and collapse    2. Slurred speech

## 2024-03-24 NOTE — ED TRIAGE NOTES
Patient arrives by EMS from golfing with grandson and having a syncopal episode about 30 minutes ago. No head injury. BS by EMS was 118. No blood thinners or ASA. Patient A&Ox4 in triage. Patient reports nausea. Hx of vasovagal response with nausea. Patient denies numbness, tingling or weakness to the right or let side of his body.

## 2024-03-24 NOTE — ED NOTES
TRANSFER - OUT REPORT:    Verbal report given to [facility] on Juarez Quiles  being transferred to [place] [id number] for routine progression of patient care       Report consisted of patient's Situation, Background, Assessment and   Recommendations(SBAR).     Information from the following report(s) Nurse Handoff Report, ED Encounter Summary, ED SBAR, Adult Overview, MAR, Neuro Assessment, and Event Log was reviewed with the receiving nurse.    [place] Assessment:    Presents to emergency department  because of falls (Syncope, seizure, or loss of consciousness): Yes  Age > 70: Yes  Altered Mental Status, Intoxication with alcohol or substance confusion (Disorientation, impaired judgment, poor safety awaremess, or inability to follow instructions): No  Impaired Mobility: Ambulates or transfers with assistive devices or assistance; Unable to ambulate or transer.: Yes  Nursing Judgement: Yes          Lines:   Peripheral IV 03/24/24 Right Antecubital (Active)   Site Assessment Clean, dry & intact 03/24/24 2733        Opportunity for questions and clarification was provided.      Patient transported with:

## 2024-03-25 ENCOUNTER — APPOINTMENT (OUTPATIENT)
Facility: HOSPITAL | Age: 79
End: 2024-03-25
Attending: INTERNAL MEDICINE
Payer: MEDICARE

## 2024-03-25 VITALS
TEMPERATURE: 97.9 F | OXYGEN SATURATION: 96 % | WEIGHT: 191 LBS | BODY MASS INDEX: 27.35 KG/M2 | RESPIRATION RATE: 13 BRPM | HEIGHT: 70 IN | SYSTOLIC BLOOD PRESSURE: 103 MMHG | DIASTOLIC BLOOD PRESSURE: 73 MMHG | HEART RATE: 62 BPM

## 2024-03-25 LAB
ALBUMIN SERPL-MCNC: 4 G/DL (ref 3.5–5)
ALBUMIN/GLOB SERPL: 1.5 (ref 1.1–2.2)
ALP SERPL-CCNC: 134 U/L (ref 45–117)
ALT SERPL-CCNC: 23 U/L (ref 12–78)
AMPHET UR QL SCN: NEGATIVE
ANION GAP SERPL CALC-SCNC: 3 MMOL/L (ref 5–15)
APPEARANCE UR: CLEAR
AST SERPL-CCNC: 26 U/L (ref 15–37)
BACTERIA URNS QL MICRO: NEGATIVE /HPF
BARBITURATES UR QL SCN: NEGATIVE
BASOPHILS # BLD: 0 K/UL (ref 0–0.1)
BASOPHILS NFR BLD: 0 % (ref 0–1)
BENZODIAZ UR QL: NEGATIVE
BILIRUB SERPL-MCNC: 0.6 MG/DL (ref 0.2–1)
BILIRUB UR QL: NEGATIVE
BUN SERPL-MCNC: 13 MG/DL (ref 6–20)
BUN/CREAT SERPL: 14 (ref 12–20)
CALCIUM SERPL-MCNC: 9 MG/DL (ref 8.5–10.1)
CANNABINOIDS UR QL SCN: NEGATIVE
CHLORIDE SERPL-SCNC: 111 MMOL/L (ref 97–108)
CHOLEST SERPL-MCNC: 176 MG/DL
CO2 SERPL-SCNC: 29 MMOL/L (ref 21–32)
COCAINE UR QL SCN: NEGATIVE
COLOR UR: NORMAL
CREAT SERPL-MCNC: 0.96 MG/DL (ref 0.7–1.3)
D DIMER PPP FEU-MCNC: 1.03 MG/L FEU (ref 0–0.65)
DIFFERENTIAL METHOD BLD: NORMAL
ECHO BSA: 2.07 M2
EOSINOPHIL # BLD: 0 K/UL (ref 0–0.4)
EOSINOPHIL NFR BLD: 0 % (ref 0–7)
EPITH CASTS URNS QL MICRO: NORMAL /LPF
ERYTHROCYTE [DISTWIDTH] IN BLOOD BY AUTOMATED COUNT: 13 % (ref 11.5–14.5)
EST. AVERAGE GLUCOSE BLD GHB EST-MCNC: 103 MG/DL
FOLATE SERPL-MCNC: 8.4 NG/ML (ref 5–21)
GLOBULIN SER CALC-MCNC: 2.7 G/DL (ref 2–4)
GLUCOSE SERPL-MCNC: 93 MG/DL (ref 65–100)
GLUCOSE UR STRIP.AUTO-MCNC: NEGATIVE MG/DL
HBA1C MFR BLD: 5.2 % (ref 4–5.6)
HCT VFR BLD AUTO: 43.4 % (ref 36.6–50.3)
HDLC SERPL-MCNC: 69 MG/DL
HDLC SERPL: 2.6 (ref 0–5)
HGB BLD-MCNC: 14 G/DL (ref 12.1–17)
HGB UR QL STRIP: NEGATIVE
HYALINE CASTS URNS QL MICRO: NORMAL /LPF (ref 0–5)
IMM GRANULOCYTES # BLD AUTO: 0 K/UL (ref 0–0.04)
IMM GRANULOCYTES NFR BLD AUTO: 0 % (ref 0–0.5)
KETONES UR QL STRIP.AUTO: NEGATIVE MG/DL
LDLC SERPL CALC-MCNC: 92.6 MG/DL (ref 0–100)
LEUKOCYTE ESTERASE UR QL STRIP.AUTO: NEGATIVE
LYMPHOCYTES # BLD: 1.5 K/UL (ref 0.8–3.5)
LYMPHOCYTES NFR BLD: 19 % (ref 12–49)
Lab: NORMAL
MAGNESIUM SERPL-MCNC: 2.1 MG/DL (ref 1.6–2.4)
MCH RBC QN AUTO: 30.9 PG (ref 26–34)
MCHC RBC AUTO-ENTMCNC: 32.3 G/DL (ref 30–36.5)
MCV RBC AUTO: 95.8 FL (ref 80–99)
METHADONE UR QL: NEGATIVE
MONOCYTES # BLD: 0.6 K/UL (ref 0–1)
MONOCYTES NFR BLD: 8 % (ref 5–13)
NEUTS SEG # BLD: 5.6 K/UL (ref 1.8–8)
NEUTS SEG NFR BLD: 73 % (ref 32–75)
NITRITE UR QL STRIP.AUTO: NEGATIVE
NRBC # BLD: 0 K/UL (ref 0–0.01)
NRBC BLD-RTO: 0 PER 100 WBC
OPIATES UR QL: NEGATIVE
PCP UR QL: NEGATIVE
PH UR STRIP: 7 (ref 5–8)
PHOSPHATE SERPL-MCNC: 3.2 MG/DL (ref 2.6–4.7)
PLATELET # BLD AUTO: 153 K/UL (ref 150–400)
PMV BLD AUTO: 10.6 FL (ref 8.9–12.9)
POTASSIUM SERPL-SCNC: 4.3 MMOL/L (ref 3.5–5.1)
PROT SERPL-MCNC: 6.7 G/DL (ref 6.4–8.2)
PROT UR STRIP-MCNC: NEGATIVE MG/DL
RBC # BLD AUTO: 4.53 M/UL (ref 4.1–5.7)
RBC #/AREA URNS HPF: NORMAL /HPF (ref 0–5)
SODIUM SERPL-SCNC: 143 MMOL/L (ref 136–145)
SP GR UR REFRACTOMETRY: 1.02 (ref 1–1.03)
TRIGL SERPL-MCNC: 72 MG/DL
TROPONIN I SERPL HS-MCNC: 21 NG/L (ref 0–76)
TSH SERPL DL<=0.05 MIU/L-ACNC: 1.62 UIU/ML (ref 0.36–3.74)
URINE CULTURE IF INDICATED: NORMAL
UROBILINOGEN UR QL STRIP.AUTO: 0.2 EU/DL (ref 0.2–1)
VIT B12 SERPL-MCNC: 117 PG/ML (ref 193–986)
VLDLC SERPL CALC-MCNC: 14.4 MG/DL
WBC # BLD AUTO: 7.7 K/UL (ref 4.1–11.1)
WBC URNS QL MICRO: NORMAL /HPF (ref 0–4)

## 2024-03-25 PROCEDURE — 83036 HEMOGLOBIN GLYCOSYLATED A1C: CPT

## 2024-03-25 PROCEDURE — 84484 ASSAY OF TROPONIN QUANT: CPT

## 2024-03-25 PROCEDURE — 83735 ASSAY OF MAGNESIUM: CPT

## 2024-03-25 PROCEDURE — 85379 FIBRIN DEGRADATION QUANT: CPT

## 2024-03-25 PROCEDURE — G0378 HOSPITAL OBSERVATION PER HR: HCPCS

## 2024-03-25 PROCEDURE — 84443 ASSAY THYROID STIM HORMONE: CPT

## 2024-03-25 PROCEDURE — 80053 COMPREHEN METABOLIC PANEL: CPT

## 2024-03-25 PROCEDURE — 2580000003 HC RX 258: Performed by: INTERNAL MEDICINE

## 2024-03-25 PROCEDURE — 82746 ASSAY OF FOLIC ACID SERUM: CPT

## 2024-03-25 PROCEDURE — 82607 VITAMIN B-12: CPT

## 2024-03-25 PROCEDURE — 93306 TTE W/DOPPLER COMPLETE: CPT | Performed by: SPECIALIST

## 2024-03-25 PROCEDURE — 93306 TTE W/DOPPLER COMPLETE: CPT

## 2024-03-25 PROCEDURE — 80307 DRUG TEST PRSMV CHEM ANLYZR: CPT

## 2024-03-25 PROCEDURE — 84100 ASSAY OF PHOSPHORUS: CPT

## 2024-03-25 PROCEDURE — 80061 LIPID PANEL: CPT

## 2024-03-25 PROCEDURE — 81001 URINALYSIS AUTO W/SCOPE: CPT

## 2024-03-25 PROCEDURE — 85025 COMPLETE CBC W/AUTO DIFF WBC: CPT

## 2024-03-25 PROCEDURE — 6370000000 HC RX 637 (ALT 250 FOR IP): Performed by: INTERNAL MEDICINE

## 2024-03-25 PROCEDURE — 6360000002 HC RX W HCPCS: Performed by: INTERNAL MEDICINE

## 2024-03-25 PROCEDURE — 95816 EEG AWAKE AND DROWSY: CPT | Performed by: PSYCHIATRY & NEUROLOGY

## 2024-03-25 PROCEDURE — 6370000000 HC RX 637 (ALT 250 FOR IP): Performed by: HOSPITALIST

## 2024-03-25 PROCEDURE — 95816 EEG AWAKE AND DROWSY: CPT | Performed by: INTERNAL MEDICINE

## 2024-03-25 PROCEDURE — 36415 COLL VENOUS BLD VENIPUNCTURE: CPT

## 2024-03-25 PROCEDURE — 95816 EEG AWAKE AND DROWSY: CPT

## 2024-03-25 RX ORDER — SODIUM CHLORIDE 0.9 % (FLUSH) 0.9 %
5-40 SYRINGE (ML) INJECTION EVERY 12 HOURS SCHEDULED
Status: DISCONTINUED | OUTPATIENT
Start: 2024-03-25 | End: 2024-03-25 | Stop reason: HOSPADM

## 2024-03-25 RX ORDER — POLYETHYLENE GLYCOL 3350 17 G/17G
17 POWDER, FOR SOLUTION ORAL DAILY PRN
Status: DISCONTINUED | OUTPATIENT
Start: 2024-03-25 | End: 2024-03-25 | Stop reason: HOSPADM

## 2024-03-25 RX ORDER — CARBAMAZEPINE 200 MG/1
400 TABLET ORAL DAILY
Status: DISCONTINUED | OUTPATIENT
Start: 2024-03-25 | End: 2024-03-25 | Stop reason: HOSPADM

## 2024-03-25 RX ORDER — SODIUM CHLORIDE 9 MG/ML
INJECTION, SOLUTION INTRAVENOUS CONTINUOUS
Status: DISCONTINUED | OUTPATIENT
Start: 2024-03-25 | End: 2024-03-25 | Stop reason: HOSPADM

## 2024-03-25 RX ORDER — ENOXAPARIN SODIUM 100 MG/ML
40 INJECTION SUBCUTANEOUS DAILY
Status: DISCONTINUED | OUTPATIENT
Start: 2024-03-25 | End: 2024-03-25 | Stop reason: HOSPADM

## 2024-03-25 RX ORDER — ACETAMINOPHEN 325 MG/1
650 TABLET ORAL EVERY 6 HOURS PRN
Status: DISCONTINUED | OUTPATIENT
Start: 2024-03-25 | End: 2024-03-25 | Stop reason: HOSPADM

## 2024-03-25 RX ORDER — SODIUM CHLORIDE 0.9 % (FLUSH) 0.9 %
5-40 SYRINGE (ML) INJECTION PRN
Status: DISCONTINUED | OUTPATIENT
Start: 2024-03-25 | End: 2024-03-25 | Stop reason: HOSPADM

## 2024-03-25 RX ORDER — ONDANSETRON 4 MG/1
4 TABLET, ORALLY DISINTEGRATING ORAL EVERY 8 HOURS PRN
Status: DISCONTINUED | OUTPATIENT
Start: 2024-03-25 | End: 2024-03-25 | Stop reason: HOSPADM

## 2024-03-25 RX ORDER — DIAZEPAM 5 MG/1
5 TABLET ORAL ONCE
Status: DISCONTINUED | OUTPATIENT
Start: 2024-03-25 | End: 2024-03-25 | Stop reason: HOSPADM

## 2024-03-25 RX ORDER — ONDANSETRON 2 MG/ML
4 INJECTION INTRAMUSCULAR; INTRAVENOUS EVERY 6 HOURS PRN
Status: DISCONTINUED | OUTPATIENT
Start: 2024-03-25 | End: 2024-03-25 | Stop reason: HOSPADM

## 2024-03-25 RX ORDER — ACETAMINOPHEN 650 MG/1
650 SUPPOSITORY RECTAL EVERY 6 HOURS PRN
Status: DISCONTINUED | OUTPATIENT
Start: 2024-03-25 | End: 2024-03-25 | Stop reason: HOSPADM

## 2024-03-25 RX ORDER — FLUTICASONE PROPIONATE 50 MCG
1 SPRAY, SUSPENSION (ML) NASAL DAILY
Status: DISCONTINUED | OUTPATIENT
Start: 2024-03-25 | End: 2024-03-25 | Stop reason: HOSPADM

## 2024-03-25 RX ORDER — SODIUM CHLORIDE 9 MG/ML
INJECTION, SOLUTION INTRAVENOUS PRN
Status: DISCONTINUED | OUTPATIENT
Start: 2024-03-25 | End: 2024-03-25 | Stop reason: HOSPADM

## 2024-03-25 RX ORDER — CARBAMAZEPINE 100 MG/1
200 TABLET, CHEWABLE ORAL DAILY
Status: DISCONTINUED | OUTPATIENT
Start: 2024-03-25 | End: 2024-03-25

## 2024-03-25 RX ORDER — CARBAMAZEPINE 200 MG/1
400 TABLET ORAL DAILY
COMMUNITY

## 2024-03-25 RX ORDER — POTASSIUM CHLORIDE 750 MG/1
40 TABLET, FILM COATED, EXTENDED RELEASE ORAL PRN
Status: DISCONTINUED | OUTPATIENT
Start: 2024-03-25 | End: 2024-03-25 | Stop reason: HOSPADM

## 2024-03-25 RX ORDER — ATORVASTATIN CALCIUM 40 MG/1
20 TABLET, FILM COATED ORAL DAILY
Status: DISCONTINUED | OUTPATIENT
Start: 2024-03-25 | End: 2024-03-25 | Stop reason: HOSPADM

## 2024-03-25 RX ORDER — MAGNESIUM SULFATE IN WATER 40 MG/ML
2000 INJECTION, SOLUTION INTRAVENOUS PRN
Status: DISCONTINUED | OUTPATIENT
Start: 2024-03-25 | End: 2024-03-25 | Stop reason: HOSPADM

## 2024-03-25 RX ORDER — POTASSIUM CHLORIDE 7.45 MG/ML
10 INJECTION INTRAVENOUS PRN
Status: DISCONTINUED | OUTPATIENT
Start: 2024-03-25 | End: 2024-03-25 | Stop reason: HOSPADM

## 2024-03-25 RX ADMIN — ENOXAPARIN SODIUM 40 MG: 100 INJECTION SUBCUTANEOUS at 08:55

## 2024-03-25 RX ADMIN — FLUTICASONE PROPIONATE 1 SPRAY: 50 SPRAY, METERED NASAL at 12:14

## 2024-03-25 RX ADMIN — ATORVASTATIN CALCIUM 20 MG: 40 TABLET, FILM COATED ORAL at 08:55

## 2024-03-25 RX ADMIN — SODIUM CHLORIDE: 9 INJECTION, SOLUTION INTRAVENOUS at 09:54

## 2024-03-25 RX ADMIN — CARBAMAZEPINE 400 MG: 200 TABLET ORAL at 12:58

## 2024-03-25 ASSESSMENT — PAIN SCALES - GENERAL: PAINLEVEL_OUTOF10: 0

## 2024-03-25 NOTE — DISCHARGE INSTRUCTIONS
Discharge Instructions       PATIENT ID: Juarez Quiles  MRN: [id number]   YOB: 1945    DATE OF ADMISSION: 3/24/2024   DATE OF DISCHARGE: 3/25/2024    PRIMARY CARE PROVIDER: Robbie Martel     ATTENDING PHYSICIAN: Carmen Marroquin MD   DISCHARGING PROVIDER: Carmen Marroquin MD    To contact this individual call [phone] and ask the  to page.   If unavailable ask to be transferred the Adult Hospitalist Department.    DISCHARGE DIAGNOSES Syncope     CONSULTATIONS: @[facility]@    PROCEDURES/SURGERIES: * No surgery found *    PENDING TEST RESULTS:   At the time of discharge the following test results are still pending:     [facility]MENTS[facility]   [facility]     ADDITIONAL CARE RECOMMENDATIONS:     DIET: cardiac diet      ACTIVITY: activity as tolerated    WOUND CARE:     EQUIPMENT needed:       DISCHARGE MEDICATIONS:   See Medication Reconciliation Form    It is important that you take the medication exactly as they are prescribed.   Keep your medication in the bottles provided by the pharmacist and keep a list of the medication names, dosages, and times to be taken in your wallet.   Do not take other medications without consulting your doctor.       NOTIFY YOUR PHYSICIAN FOR ANY OF THE FOLLOWING:   Fever over 101 degrees for 24 hours.   Chest pain, shortness of breath, fever, chills, nausea, vomiting, diarrhea, change in mentation, falling, weakness, bleeding. Severe pain or pain not relieved by medications.  Or, any other signs or symptoms that you may have questions about.      DISPOSITION:   x Home With:   OT  PT  HH  RN       SNF/Inpatient Rehab/LTAC    Independent/assisted living    Hospice    Other:     CDMP Checked:   Yes x     PROBLEM LIST Updated:  Yes x       Signed:   Carmen Marroquin MD  3/25/2024  3:11 PM

## 2024-03-25 NOTE — PLAN OF CARE
Problem: Discharge Planning  Goal: Discharge to home or other facility with appropriate resources  Outcome: Progressing  Flowsheets (Taken 3/24/2024 2039)  Discharge to home or other facility with appropriate resources:   Identify barriers to discharge with patient and caregiver   Arrange for needed discharge resources and transportation as appropriate   Identify discharge learning needs (meds, wound care, etc)   Refer to discharge planning if patient needs post-hospital services based on physician order or complex needs related to functional status, cognitive ability or social support system     Problem: Pain  Goal: Verbalizes/displays adequate comfort level or baseline comfort level  Outcome: Progressing     Problem: Chronic Conditions and Co-morbidities  Goal: Patient's chronic conditions and co-morbidity symptoms are monitored and maintained or improved  Outcome: Progressing     Problem: Neurosensory - Adult  Goal: Achieves stable or improved neurological status  Outcome: Progressing  Goal: Achieves maximal functionality and self care  Outcome: Progressing     Problem: Skin/Tissue Integrity - Adult  Goal: Skin integrity remains intact  Outcome: Progressing  Goal: Oral mucous membranes remain intact  Outcome: Progressing     Problem: Musculoskeletal - Adult  Goal: Return mobility to safest level of function  Outcome: Progressing  Goal: Maintain proper alignment of affected body part  Outcome: Progressing  Goal: Return ADL status to a safe level of function  Outcome: Progressing     Problem: Gastrointestinal - Adult  Goal: Maintains adequate nutritional intake  Outcome: Progressing     Problem: Genitourinary - Adult  Goal: Absence of urinary retention  Outcome: Progressing     Problem: Metabolic/Fluid and Electrolytes - Adult  Goal: Electrolytes maintained within normal limits  Outcome: Progressing  Goal: Hemodynamic stability and optimal renal function maintained  Outcome: Progressing  Goal: Glucose maintained  patient/family identify possible irrational spiritual/cultural beliefs and values.  3. Explore possibilities of making amends & reconciliation with self, others, and/or a greater power.  4. Guide patient/family in identifying painful feelings of guilt.  5. Help patient/famiy explore and identify spiritual beliefs, cultural understandings or values that may help or hinder letting go of issue.  6. Help patient/family explore feelings of anger, bitterness, resentment.  7. Help patient/family identify and examine the situation in which these feelings are experienced.  8. Help patient/family identify destructive displacement of feelings onto other individuals.  9. Invite use of sacraments/rituals/ceremonies as appropriate (e.g. - confession, anointing, smudging).  10. Refer patient/family to formal counseling and/or to [place] community for further support work.  Outcome: Progressing

## 2024-03-25 NOTE — H&P
History and Physical    Date of Service:  3/24/2024  Primary Care Provider: Robbie Martel MD  Source of information: The patient and review of EMR    Chief Complaint: Loss of Consciousness      History of Presenting Illness:   Juarez Quiles is a 79 y.o. male with past medical history significant for dyslipidemia, seizure disorder presented at [facility] emergency room at [facility] with loss of consciousness.  Patient was golfing with grandson when he passed out.  Patient had no recollection of the event.  It occurred 30 minutes before coming to the emergency room.  Patient developed difficulty with speech after the episode.  There was no headache, dizziness, blurring of vision, chest pain prior to this episode.  Patient blood sugar checked by EMS was 118 at the time of this incident.  Patient was initially brought to the emergency room as code stroke Level One alert.  Patient was seen by teleneurologist who did not believe that the patient is having a stroke.  CT scan of the head done on arrival at emergency room was negative for acute pathology.  CTA of the head and neck did not show large vessel occlusion.  Patient has no prior history of TIA or CVA.  He was referred to the hospitalist service for admission for syncope workup.       REVIEW OF SYSTEMS:  REVIEW OF SYSTEMS:  HEAD, EYES, EARS, NOSE AND THROAT: Dizziness positive for syncope, no headache.  No dizziness.  No blurring of vision.  No photophobia.  RESPIRATORY SYSTEM: No shortness of breath.  No cough.  No hemoptysis.  CARDIOVASCULAR SYSTEM: No chest pain.  No orthopnea.  No palpitations.  GASTROINTESTINAL SYSTEM:  No nausea or vomiting.  No diarrhea.  No constipation.  GENITOURINARY SYSTEM:  No dysuria, no urgency, and no frequency.     All other systems are reviewed and they are negative.        Past Medical History:   Diagnosis Date    History of actinic keratoses     Seizure disorder (HCC)     Shoulder fracture, right 2017

## 2024-03-25 NOTE — PLAN OF CARE
Problem: Discharge Planning  Goal: Discharge to home or other facility with appropriate resources  3/25/2024 1511 by Montserrat Lewis RN  Outcome: Adequate for Discharge  3/25/2024 1003 by Nevin Sierra RN  Outcome: Progressing     Problem: Pain  Goal: Verbalizes/displays adequate comfort level or baseline comfort level  3/25/2024 1511 by Montserrat Lewis RN  Outcome: Adequate for Discharge  3/25/2024 1003 by Nevin Sierra RN  Outcome: Progressing     Problem: Chronic Conditions and Co-morbidities  Goal: Patient's chronic conditions and co-morbidity symptoms are monitored and maintained or improved  3/25/2024 1511 by Montserrat Lewis RN  Outcome: Adequate for Discharge  3/25/2024 1003 by Nevin Sierra RN  Outcome: Progressing     Problem: Neurosensory - Adult  Goal: Achieves stable or improved neurological status  3/25/2024 1511 by Montserrat Lewis RN  Outcome: Adequate for Discharge  3/25/2024 1003 by Nevin Sierra RN  Outcome: Progressing  Goal: Achieves maximal functionality and self care  3/25/2024 1511 by Montserrat Lewis RN  Outcome: Adequate for Discharge  3/25/2024 1003 by Nevin Sierra RN  Outcome: Progressing     Problem: Skin/Tissue Integrity - Adult  Goal: Skin integrity remains intact  3/25/2024 1511 by Montserrat Lewis RN  Outcome: Adequate for Discharge  3/25/2024 1003 by Nevin Sierra RN  Outcome: Progressing  Goal: Oral mucous membranes remain intact  3/25/2024 1511 by Montserrat Lewis RN  Outcome: Adequate for Discharge  3/25/2024 1003 by Nevin Sierra RN  Outcome: Progressing     Problem: Musculoskeletal - Adult  Goal: Return mobility to safest level of function  3/25/2024 1511 by Montserrat Lewis RN  Outcome: Adequate for Discharge  3/25/2024 1003 by Nevin Sierra RN  Outcome: Progressing  Goal: Maintain proper alignment of affected body part  3/25/2024 1511 by Montserrat Lewis RN  Outcome: Adequate for Discharge  3/25/2024 1003 by Sierra, Nevin, RN  Outcome: Progressing  Goal:  appropriate  5. Monitor and intervene to maintain adequate nutrition, hydration, elimination, sleep and activity  6. If unable to ensure safety without constant attention obtain sitter and review sitter guidelines with assigned personnel  7. Initiate Psychosocial CNS and Spiritual Care consult, as indicated  3/25/2024 1511 by Montserrat Lewis RN  Outcome: Adequate for Discharge  3/25/2024 1003 by Nevin Sierra RN  Outcome: Progressing     Problem: Anxiety  Goal: Will report anxiety at manageable levels  Description: INTERVENTIONS:  1. Administer medication as ordered  2. Teach and rehearse alternative coping skills  3. Provide emotional support with 1:1 interaction with staff  3/25/2024 1511 by Montserrat Lewis RN  Outcome: Adequate for Discharge  3/25/2024 1003 by Nevin Sierra RN  Outcome: Progressing     Problem: Self Care Deficit  Goal: Return ADL status to a safe level of function  Description: INTERVENTIONS:  1. Administer medication as ordered  2. Assess ADL deficits and provide assistive devices as needed  3. Obtain PT/OT consults as needed  4. Assist and instruct patient to increase activity and self care as tolerated  3/25/2024 1511 by Montserrat Lewis RN  Outcome: Adequate for Discharge  3/25/2024 1003 by Nevin Sierra RN  Outcome: Progressing     Problem: Spiritual Care  Goal: Pt/Family able to move forward in process of forgiving self, others, and/or higher power  Description: INTERVENTIONS:  1. Assist patient/family to overcome blocks to healing by use of spiritual practices (prayer, meditation, guided imagery, reiki, breath work, etc).  2. De-myth guilt and help patient/family identify possible irrational spiritual/cultural beliefs and values.  3. Explore possibilities of making amends & reconciliation with self, others, and/or a greater power.  4. Guide patient/family in identifying painful feelings of guilt.  5. Help patient/famiy explore and identify spiritual beliefs, cultural understandings

## 2024-03-25 NOTE — PLAN OF CARE
Problem: Discharge Planning  Goal: Discharge to home or other facility with appropriate resources  3/25/2024 1003 by Nevin Sierra RN  Outcome: Progressing  3/25/2024 0035 by Sonia Hamilton RN  Outcome: Progressing  Flowsheets (Taken 3/24/2024 2039)  Discharge to home or other facility with appropriate resources:   Identify barriers to discharge with patient and caregiver   Arrange for needed discharge resources and transportation as appropriate   Identify discharge learning needs (meds, wound care, etc)   Refer to discharge planning if patient needs post-hospital services based on physician order or complex needs related to functional status, cognitive ability or social support system     Problem: Pain  Goal: Verbalizes/displays adequate comfort level or baseline comfort level  3/25/2024 1003 by Nevin Sierra RN  Outcome: Progressing  3/25/2024 0035 by Sonia Hamilton RN  Outcome: Progressing     Problem: Chronic Conditions and Co-morbidities  Goal: Patient's chronic conditions and co-morbidity symptoms are monitored and maintained or improved  3/25/2024 1003 by Nevin Sierra RN  Outcome: Progressing  3/25/2024 0035 by Sonia Hamilton RN  Outcome: Progressing     Problem: Neurosensory - Adult  Goal: Achieves stable or improved neurological status  3/25/2024 1003 by Nevin Sierra RN  Outcome: Progressing  3/25/2024 0035 by Sonia Hamilton RN  Outcome: Progressing  Goal: Achieves maximal functionality and self care  3/25/2024 1003 by Nevin Sierra RN  Outcome: Progressing  3/25/2024 0035 by Sonia Hamilton RN  Outcome: Progressing     Problem: Skin/Tissue Integrity - Adult  Goal: Skin integrity remains intact  3/25/2024 1003 by Neivn Sierra RN  Outcome: Progressing  3/25/2024 0035 by Sonia Hamilton RN  Outcome: Progressing  Goal: Oral mucous membranes remain intact  3/25/2024 1003 by Nevin Sierra RN  Outcome: Progressing  3/25/2024 0035 by Sonia Hamilton RN  Outcome:  possible irrational spiritual/cultural beliefs and values.  3. Explore possibilities of making amends & reconciliation with self, others, and/or a greater power.  4. Guide patient/family in identifying painful feelings of guilt.  5. Help patient/famiy explore and identify spiritual beliefs, cultural understandings or values that may help or hinder letting go of issue.  6. Help patient/family explore feelings of anger, bitterness, resentment.  7. Help patient/family identify and examine the situation in which these feelings are experienced.  8. Help patient/family identify destructive displacement of feelings onto other individuals.  9. Invite use of sacraments/rituals/ceremonies as appropriate (e.g. - confession, anointing, smudging).  10. Refer patient/family to formal counseling and/or to [facility] community for further support work.  3/25/2024 1003 by Nevin Sierra, RN  Outcome: Progressing  3/25/2024 0035 by Sonia Hamilton, RN  Outcome: Progressing

## 2024-03-25 NOTE — CARE COORDINATION
Care Management Initial Assessment       RUR: 9%  Readmission? No  1st IM letter given? Yes - 3/25    JACKIE: Home with spouse    Transport: Spouse    CM met with patient and his wifeAshleigh at bedside to introduce self and role. Pt lives at home with his wife.    ADLs: Independent  DME: None  PCP follow up: Dr. Robbie Martel - last seen Dec.   Previous Home Health: None  Previous Skilled Nursing Facility: none  Previous Inpatient Rehab: none  Insurance verified: Yes, Medicare A&B and [place] Medicare supplement  Pharmacy: [facility]  Emergency Contact: SpouseAshleigh [phone]    CM will follow patient progress and assist as needed with JACKIE plan.       03/25/24 1156   Service Assessment   Patient Orientation Alert and Oriented;Person;Situation;Place;Self   Cognition Alert   History Provided By Patient;Significant Other   Primary Caregiver Self   Accompanied By/Relationship Spouse   Support Systems Spouse/Significant Other   Patient's Healthcare Decision Maker is: Legal Next of Kin   PCP Verified by CM Yes   Last Visit to PCP Within last 6 months   Prior Functional Level Independent in ADLs/IADLs   Current Functional Level Independent in ADLs/IADLs   Can patient return to prior living arrangement Yes   Ability to make needs known: Good   Family able to assist with home care needs: Yes   Financial Resources Medicare   Social/Functional History   Lives With Spouse   Type of Home House   Home Equipment None   ADL Assistance Independent   Homemaking Assistance Independent   Homemaking Responsibilities Yes   Ambulation Assistance Independent   Transfer Assistance Independent   Active  No   Discharge Planning   Type of Residence House   Living Arrangements Spouse/Significant Other   Current Services Prior To Admission None   Patient expects to be discharged to: House   Services At/After Discharge   Confirm Follow Up Transport SANDRA Gordon (Ally).

## 2024-03-25 NOTE — PROGRESS NOTES
I have reviewed discharge instructions and medications with patient and wife. Patient's wife will be taking pt home.       Stroke Education provided to patient and spouse/SO and the following topics were discussed    1. Patients personal risk factors for stroke are none    2. Warning signs of Stroke:        * Sudden numbness or weakness of the face, arm or leg, especially on one side of          The body            * Sudden confusion, trouble speaking or understanding        * Sudden trouble seeing in one or both eyes        * Sudden trouble walking, dizziness, loss of balance or coordination        * Sudden severe headache with no known cause      3. Importance of activation Emergency Medical Services ( 9-1-1 ) immediately if experience any warning signs of stroke.    4. Be sure and schedule a follow-up appointment with your primary care doctor or any specialists as instructed.     5. You must take medicine every day to treat your risk factors for stroke.  Be sure to take your medicines exactly as your doctor tells you: no more, no less.  Know what your medicines are for , what they do.  Anti-thrombotics /anticoagulants can help prevent strokes.  You are taking the following medicine(s)  n/a     6.  Smoking and second-hand smoke greatly increase your risk of stroke, cardiovascular disease and death. Smoking history    7. Information provided was BS Stroke Education Binder, Stroke Handouts, or Verbal Education    8. Documentation of teaching completed in Patient Education Activity and on Care Plan with teaching response noted?  yes

## 2024-03-25 NOTE — DISCHARGE SUMMARY
Discharge Summary       PATIENT ID: Juarez Quiles  MRN: [id number]   YOB: 1945    DATE OF ADMISSION: 3/24/2024  2:43 PM    DATE OF DISCHARGE: 3/25/24   PRIMARY CARE PROVIDER: Robbie Martel MD     ATTENDING PHYSICIAN: carmen marroquin  DISCHARGING PROVIDER: Carmen Marroquin MD    To contact this individual call [phone] and ask the  to page.  If unavailable ask to be transferred the Adult Hospitalist Department.    CONSULTATIONS: IP CONSULT TO TELE-NEUROLOGY  IP CONSULT TO HOSPITALIST    PROCEDURES/SURGERIES: * No surgery found *    ADMITTING DIAGNOSES & HOSPITAL COURSE:     Juarez Quiles is a 79 y.o. male with past medical history significant for dyslipidemia, seizure disorder presented at [facility] emergency room at [facility] [place] with [place] of consciousness.  Patient was golfing with grandson when he passed out.  Patient had no recollection of the event.  It occurred 30 minutes before coming to the emergency room.  Patient developed difficulty with speech after the episode.  There was no headache, dizziness, blurring of vision, chest pain prior to this episode.  Patient blood sugar checked by EMS was 118 at the time of this incident.  Patient was initially brought to the emergency room as code stroke Level One alert.  Patient was seen by teleneurologist who did not believe that the patient is having a stroke.  CT scan of the head done on arrival at emergency room was negative for acute pathology.  CTA of the head and neck did not show large vessel occlusion.  Patient has no prior history of TIA or CVA.  He was referred to the hospitalist service for admission for syncope workup.    Patient was admitted for syncope and collapse patient has previous history of vasovagal syncope event with orthostatic blood pressure 1 get dehydrated today patient was not orthostatic echocardiogram was normal EF 55 to 60% D-dimer was 1.03 but patient is on room air and does not look like any  2 seconds  Abd: soft/ Non tender, non distended, BS physiological,   Ext: no clubbing, no cyanosis, no edema, brisk 2+ DP pulses  Neuro/Psych: pleasant mood and affect, CN 2-12 grossly intact, sensory grossly within normal limit, Strength 5/5 in all extremities, DTR 1+ x 4  Skin: warm     CHRONIC MEDICAL DIAGNOSES:      Greater than 31 minutes were spent with the patient on counseling and coordination of care    Signed:   Carmen Marroquin MD  3/25/2024  4:22 PM

## 2024-03-25 NOTE — PROGRESS NOTES
03/24/24 1458 03/25/24 0423   WBC 7.1 7.7   HGB 14.0 14.0   HCT 41.8 43.4    153     Recent Labs     03/24/24  1458 03/25/24 0423    143   K 3.5 4.3    111*   CO2 27 29   BUN 15 13   MG  --  2.1   PHOS  --  3.2     Recent Labs     03/24/24  1458 03/25/24 0423   ALT 20 23   GLOB 3.1 2.7     No results for input(s): \"INR\", \"APTT\" in the last 72 hours.    Invalid input(s): \"PTP\"   No results for input(s): \"TIBC\", \"FERR\" in the last 72 hours.    Invalid input(s): \"FE\", \"PSAT\"   No results found for: \"FOL\", \"RBCF\"   No results for input(s): \"PH\", \"PCO2\", \"PO2\" in the last 72 hours.  No results for input(s): \"CPK\" in the last 72 hours.    Invalid input(s): \"CPKMB\", \"CKNDX\", \"TROIQ\"  Lab Results   Component Value Date/Time    CHOL 176 03/25/2024 04:23 AM    HDL 69 03/25/2024 04:23 AM     No results found for: \"GLUCPOC\"        Medications Reviewed:     Current Facility-Administered Medications   Medication Dose Route Frequency    atorvastatin (LIPITOR) tablet 20 mg  20 mg Oral Daily    sodium chloride flush 0.9 % injection 5-40 mL  5-40 mL IntraVENous 2 times per day    sodium chloride flush 0.9 % injection 5-40 mL  5-40 mL IntraVENous PRN    0.9 % sodium chloride infusion   IntraVENous PRN    potassium chloride (KLOR-CON) extended release tablet 40 mEq  40 mEq Oral PRN    Or    potassium bicarb-citric acid (EFFER-K) effervescent tablet 40 mEq  40 mEq Oral PRN    Or    potassium chloride 10 mEq/100 mL IVPB (Peripheral Line)  10 mEq IntraVENous PRN    magnesium sulfate 2000 mg in 50 mL IVPB premix  2,000 mg IntraVENous PRN    enoxaparin (LOVENOX) injection 40 mg  40 mg SubCUTAneous Daily    ondansetron (ZOFRAN-ODT) disintegrating tablet 4 mg  4 mg Oral Q8H PRN    Or    ondansetron (ZOFRAN) injection 4 mg  4 mg IntraVENous Q6H PRN    polyethylene glycol (GLYCOLAX) packet 17 g  17 g Oral Daily PRN    acetaminophen (TYLENOL) tablet 650 mg  650 mg Oral Q6H PRN    Or    acetaminophen (TYLENOL) suppository  650 mg  650 mg Rectal Q6H PRN    0.9 % sodium chloride infusion   IntraVENous Continuous    diazePAM (VALIUM) tablet 5 mg  5 mg Oral Once    fluticasone (FLONASE) 50 MCG/ACT nasal spray 1 spray  1 spray Each Nostril Daily    carBAMazepine (TEGRETOL) tablet 400 mg  400 mg Oral Daily     ______________________________________________________________________  EXPECTED LENGTH OF STAY: Unable to retrieve estimated LOS  ACTUAL LENGTH OF STAY:          1                 Carmen Marroquin MD

## 2024-07-27 ENCOUNTER — APPOINTMENT (OUTPATIENT)
Facility: HOSPITAL | Age: 79
End: 2024-07-27
Payer: MEDICARE

## 2024-07-27 ENCOUNTER — HOSPITAL ENCOUNTER (EMERGENCY)
Facility: HOSPITAL | Age: 79
Discharge: HOME | End: 2024-07-27
Attending: EMERGENCY MEDICINE
Payer: MEDICARE

## 2024-07-27 VITALS
BODY MASS INDEX: 25.56 KG/M2 | HEART RATE: 62 BPM | OXYGEN SATURATION: 96 % | SYSTOLIC BLOOD PRESSURE: 118 MMHG | TEMPERATURE: 98 F | HEIGHT: 70 IN | DIASTOLIC BLOOD PRESSURE: 93 MMHG | WEIGHT: 178.57 LBS | RESPIRATION RATE: 10 BRPM

## 2024-07-27 DIAGNOSIS — S09.90XA INJURY OF HEAD, INITIAL ENCOUNTER: Primary | ICD-10-CM
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
## 2024-07-27 PROCEDURE — 70450 CT HEAD/BRAIN W/O DYE: CPT

## 2024-07-27 PROCEDURE — 99284 EMERGENCY DEPT VISIT MOD MDM: CPT

## 2024-07-27 PROCEDURE — 72125 CT NECK SPINE W/O DYE: CPT

## 2024-07-27 ASSESSMENT — ENCOUNTER SYMPTOMS
SHORTNESS OF BREATH: 0
DIARRHEA: 0
VOMITING: 0
RHINORRHEA: 0
BACK PAIN: 0
SORE THROAT: 0
COUGH: 0
EYE PAIN: 0
ABDOMINAL PAIN: 0
COLOR CHANGE: 0
NAUSEA: 0

## 2024-07-27 NOTE — ED TRIAGE NOTES
Pt arrives via EMS after a GLF (did hit left forehead and has a small abrasion) while out to eat. - blood thinners. He claims he often vasos and then feels cold and diaphoretic, which is what occurred tonight.

## 2024-07-27 NOTE — ED PROVIDER NOTES
[facility] EMERGENCY DEP  EMERGENCY DEPARTMENT ENCOUNTER      Pt Name: Juarez Quiles  MRN: [id number]  Birthdate 1945  Date of evaluation: 7/27/2024  Provider: Med Schaefer MD    CHIEF COMPLAINT       Chief Complaint   Patient presents with    Fall         HISTORY OF PRESENT ILLNESS   (Location/Symptom, Timing/Onset, Context/Setting, Quality, Duration, Modifying Factors, Severity)  Note limiting factors.   79-year-old male had a trip and fall injury where he struck his head.  No loss of consciousness.  No neck or extremity pain.  He had no syncope prior to the fall.  He is otherwise stable.    The history is provided by the patient.   Fall  The accident occurred Less than 1 hour ago. The fall occurred while walking. He fell from a height of 3 to 5 ft. He landed on [place]. The point of impact was the head. The pain is present in the head. The pain is mild. He was Ambulatory at the scene. There was No entrapment after the fall. There was No drug use involved in the accident. There was No alcohol use involved in the accident. Pertinent negatives include no fever, no numbness, no abdominal pain, no nausea and no vomiting.         Review of External Medical Records:     Nursing Notes were reviewed.    REVIEW OF SYSTEMS    (2-9 systems for level 4, 10 or more for level 5)     Review of Systems   Constitutional:  Negative for fatigue and fever.   HENT:  Negative for ear pain, rhinorrhea and sore throat.    Eyes:  Negative for pain and visual disturbance.   Respiratory:  Negative for cough and shortness of breath.    Cardiovascular:  Negative for chest pain.   Gastrointestinal:  Negative for abdominal pain, diarrhea, nausea and vomiting.   Genitourinary:  Negative for dysuria.   Musculoskeletal:  Negative for arthralgias, back pain and joint swelling.   Skin:  Negative for color change and rash.   Neurological:  Negative for dizziness, syncope and numbness.   Psychiatric/Behavioral:  Negative for agitation,

## 2024-09-04 ENCOUNTER — HOSPITAL ENCOUNTER (EMERGENCY)
Facility: HOSPITAL | Age: 79
Discharge: HOME | End: 2024-09-04
Attending: EMERGENCY MEDICINE
Payer: MEDICARE

## 2024-09-04 ENCOUNTER — APPOINTMENT (OUTPATIENT)
Facility: HOSPITAL | Age: 79
End: 2024-09-04
Payer: MEDICARE

## 2024-09-04 VITALS
HEART RATE: 93 BPM | WEIGHT: 179.45 LBS | BODY MASS INDEX: 25.75 KG/M2 | SYSTOLIC BLOOD PRESSURE: 134 MMHG | RESPIRATION RATE: 16 BRPM | OXYGEN SATURATION: 100 % | DIASTOLIC BLOOD PRESSURE: 94 MMHG | TEMPERATURE: 97.8 F

## 2024-09-04 DIAGNOSIS — R26.9 GAIT DIFFICULTY: Primary | ICD-10-CM

## 2024-09-04 LAB
ALBUMIN SERPL-MCNC: 4.3 G/DL (ref 3.5–5)
ALBUMIN/GLOB SERPL: 1.4 (ref 1.1–2.2)
ALP SERPL-CCNC: 170 U/L (ref 45–117)
ALT SERPL-CCNC: 25 U/L (ref 12–78)
ANION GAP SERPL CALC-SCNC: 1 MMOL/L (ref 5–15)
APPEARANCE UR: CLEAR
AST SERPL-CCNC: 27 U/L (ref 15–37)
BACTERIA URNS QL MICRO: NEGATIVE /HPF
BASOPHILS # BLD: 0 K/UL (ref 0–0.1)
BASOPHILS NFR BLD: 0 % (ref 0–1)
BILIRUB SERPL-MCNC: 0.5 MG/DL (ref 0.2–1)
BILIRUB UR QL CFM: NEGATIVE
BUN SERPL-MCNC: 18 MG/DL (ref 6–20)
BUN/CREAT SERPL: 15 (ref 12–20)
CALCIUM SERPL-MCNC: 9.3 MG/DL (ref 8.5–10.1)
CHLORIDE SERPL-SCNC: 108 MMOL/L (ref 97–108)
CO2 SERPL-SCNC: 31 MMOL/L (ref 21–32)
COLOR UR: ABNORMAL
COMMENT:: NORMAL
CREAT SERPL-MCNC: 1.24 MG/DL (ref 0.7–1.3)
DIFFERENTIAL METHOD BLD: ABNORMAL
EKG ATRIAL RATE: 286 BPM
EKG ATRIAL RATE: 88 BPM
EKG DIAGNOSIS: NORMAL
EKG DIAGNOSIS: NORMAL
EKG P AXIS: 65 DEGREES
EKG P AXIS: 88 DEGREES
EKG P-R INTERVAL: 158 MS
EKG Q-T INTERVAL: 340 MS
EKG Q-T INTERVAL: 352 MS
EKG QRS DURATION: 78 MS
EKG QRS DURATION: 96 MS
EKG QTC CALCULATION (BAZETT): 401 MS
EKG QTC CALCULATION (BAZETT): 411 MS
EKG R AXIS: 69 DEGREES
EKG R AXIS: 9 DEGREES
EKG T AXIS: -25 DEGREES
EKG T AXIS: -39 DEGREES
EKG VENTRICULAR RATE: 78 BPM
EKG VENTRICULAR RATE: 88 BPM
EOSINOPHIL # BLD: 0 K/UL (ref 0–0.4)
EOSINOPHIL NFR BLD: 0 % (ref 0–7)
EPITH CASTS URNS QL MICRO: ABNORMAL /LPF
ERYTHROCYTE [DISTWIDTH] IN BLOOD BY AUTOMATED COUNT: 13 % (ref 11.5–14.5)
GLOBULIN SER CALC-MCNC: 3 G/DL (ref 2–4)
GLUCOSE SERPL-MCNC: 104 MG/DL (ref 65–100)
GLUCOSE UR STRIP.AUTO-MCNC: NEGATIVE MG/DL
HCT VFR BLD AUTO: 42.7 % (ref 36.6–50.3)
HGB BLD-MCNC: 14 G/DL (ref 12.1–17)
HGB UR QL STRIP: NEGATIVE
HYALINE CASTS URNS QL MICRO: ABNORMAL /LPF (ref 0–5)
IMM GRANULOCYTES # BLD AUTO: 0 K/UL (ref 0–0.04)
IMM GRANULOCYTES NFR BLD AUTO: 0 % (ref 0–0.5)
KETONES UR QL STRIP.AUTO: ABNORMAL MG/DL
LEUKOCYTE ESTERASE UR QL STRIP.AUTO: NEGATIVE
LYMPHOCYTES # BLD: 0.9 K/UL (ref 0.8–3.5)
LYMPHOCYTES NFR BLD: 14 % (ref 12–49)
MCH RBC QN AUTO: 30.8 PG (ref 26–34)
MCHC RBC AUTO-ENTMCNC: 32.8 G/DL (ref 30–36.5)
MCV RBC AUTO: 94.1 FL (ref 80–99)
MONOCYTES # BLD: 0.6 K/UL (ref 0–1)
MONOCYTES NFR BLD: 8 % (ref 5–13)
NEUTS SEG # BLD: 5.3 K/UL (ref 1.8–8)
NEUTS SEG NFR BLD: 78 % (ref 32–75)
NITRITE UR QL STRIP.AUTO: NEGATIVE
NRBC # BLD: 0 K/UL (ref 0–0.01)
NRBC BLD-RTO: 0 PER 100 WBC
PH UR STRIP: 5 (ref 5–8)
PLATELET # BLD AUTO: 182 K/UL (ref 150–400)
PMV BLD AUTO: 10.3 FL (ref 8.9–12.9)
POTASSIUM SERPL-SCNC: 4 MMOL/L (ref 3.5–5.1)
PROT SERPL-MCNC: 7.3 G/DL (ref 6.4–8.2)
PROT UR STRIP-MCNC: 30 MG/DL
RBC # BLD AUTO: 4.54 M/UL (ref 4.1–5.7)
RBC #/AREA URNS HPF: ABNORMAL /HPF (ref 0–5)
SODIUM SERPL-SCNC: 140 MMOL/L (ref 136–145)
SP GR UR REFRACTOMETRY: 1.03 (ref 1–1.03)
SPECIMEN HOLD: NORMAL
SPECIMEN HOLD: NORMAL
TROPONIN I SERPL HS-MCNC: 14 NG/L (ref 0–76)
UROBILINOGEN UR QL STRIP.AUTO: 1 EU/DL (ref 0.2–1)
WBC # BLD AUTO: 6.9 K/UL (ref 4.1–11.1)
WBC URNS QL MICRO: ABNORMAL /HPF (ref 0–4)

## 2024-09-04 PROCEDURE — 93005 ELECTROCARDIOGRAM TRACING: CPT | Performed by: THORACIC SURGERY (CARDIOTHORACIC VASCULAR SURGERY)

## 2024-09-04 PROCEDURE — 84484 ASSAY OF TROPONIN QUANT: CPT

## 2024-09-04 PROCEDURE — 70450 CT HEAD/BRAIN W/O DYE: CPT

## 2024-09-04 PROCEDURE — 81001 URINALYSIS AUTO W/SCOPE: CPT

## 2024-09-04 PROCEDURE — 36415 COLL VENOUS BLD VENIPUNCTURE: CPT

## 2024-09-04 PROCEDURE — 85025 COMPLETE CBC W/AUTO DIFF WBC: CPT

## 2024-09-04 PROCEDURE — 99284 EMERGENCY DEPT VISIT MOD MDM: CPT

## 2024-09-04 PROCEDURE — 80053 COMPREHEN METABOLIC PANEL: CPT

## 2024-09-04 PROCEDURE — 93005 ELECTROCARDIOGRAM TRACING: CPT

## 2024-09-04 ASSESSMENT — PAIN - FUNCTIONAL ASSESSMENT: PAIN_FUNCTIONAL_ASSESSMENT: NONE - DENIES PAIN

## 2024-09-04 ASSESSMENT — PAIN SCALES - GENERAL: PAINLEVEL_OUTOF10: 0

## 2024-09-04 NOTE — ED NOTES
78 y/o/w/m presents with wife who states patient's speech is \"garbled.\" Also states \"his gait is off and he falls.\"  They states Dr. Martel called them and told them he may be having a stroke based on results from his last visit 7/27/2024. Patient currently denies any complaints. GCS 15.     VAN -negative  Stable gait into triage.      12:00 PM  I have evaluated the patient as the Provider in Rapid Medical Evaluation (RME). I have reviewed his vital signs and the triage nurse assessment. I have talked with the patient and any available family and advised that I am the provider in triage and have ordered the appropriate study to initiate their work up based on the clinical presentation during my assessment. I have advised that the patient will be accommodated in the Main ED as soon as possible. I have also requested to contact the triage nurse or myself immediately if the patient experiences any changes in their condition during this brief waiting period.  BRIDGER Cummings NP, Kelly E, APRN - NP  09/04/24 6811

## 2024-09-04 NOTE — ED PROVIDER NOTES
[facility] EMERGENCY DEP  EMERGENCY DEPARTMENT ENCOUNTER      Pt Name: Juarez Quiles  MRN: [id number]  Birthdate 1945  Date of evaluation: 9/4/2024  Provider: Obinna Saunders MD    CHIEF COMPLAINT       Chief Complaint   Patient presents with    Fall         HISTORY OF PRESENT ILLNESS   (Location/Symptom, Timing/Onset, Context/Setting, Quality, Duration, Modifying Factors, Severity)  Note limiting factors.   79-year-old with a history of seizures, hyperlipidemia.  He presents with a multiple month history of his gait being off.  He has had falls.  He denies any injury.  His wife also feels like his speech has been garbled at times.  He denies any complaints.  No chest pain, shortness of breath, headache, fever, cough, vomiting, diarrhea.  He was referred to the ED by his primary care doctor.  His wife states they are looking into getting him a neurology appointment.          Review of External Medical Records:     Nursing Notes were reviewed.    REVIEW OF SYSTEMS    (2-9 systems for level 4, 10 or more for level 5)     Review of Systems    Except as noted above the remainder of the review of systems was reviewed and negative.       PAST MEDICAL HISTORY     Past Medical History:   Diagnosis Date    History of actinic keratoses     Seizure disorder (HCC)     Shoulder fracture, right 2017    Skin cancer 9/17/2012    basal cell carcinoma-right nose,scalp         SURGICAL HISTORY       Past Surgical History:   Procedure Laterality Date    COLONOSCOPY N/A 8/14/2017    COLONOSCOPY performed by Fredis Banks MD at [facility] ENDOSCOPY    COLONOSCOPY N/A 6/1/2021    COLONOSCOPY   :- performed by Blake Badillo MD at [facility] ENDOSCOPY    MOHS SURGERY  06/27/2017    BCC vertex scalp by Dr. Sierra     OTHER SURGICAL HISTORY Left     shoulder surgery         CURRENT MEDICATIONS       Previous Medications    CARBAMAZEPINE (TEGRETOL) 200 MG TABLET    Take 2 tablets by mouth daily Two 200 mg tablets once a day.    SIMVASTATIN (ZOCOR)

## 2024-09-04 NOTE — ED TRIAGE NOTES
Pt brought in by wife, pt stated he does not know why he is here , she stated that his speech is garbled, pt speech is clear, stated his gait is off and he falls a lot, symptoms for months, denies fever, no recent head trauma, no blood thinners, denies numbness/tingling to face, arms or legs

## 2024-09-04 NOTE — ED NOTES
Discharge instructions and follow-up reviewed with patient. Patient ambulatory out of ED. Vitals stable.

## 2024-12-07 ENCOUNTER — HOSPITAL ENCOUNTER (EMERGENCY)
Facility: HOSPITAL | Age: 79
Discharge: HOME | End: 2024-12-07
Attending: STUDENT IN AN ORGANIZED HEALTH CARE EDUCATION/TRAINING PROGRAM
Payer: MEDICARE

## 2024-12-07 ENCOUNTER — APPOINTMENT (OUTPATIENT)
Facility: HOSPITAL | Age: 79
End: 2024-12-07
Payer: MEDICARE

## 2024-12-07 VITALS
BODY MASS INDEX: 27.14 KG/M2 | HEART RATE: 66 BPM | RESPIRATION RATE: 16 BRPM | TEMPERATURE: 98.2 F | WEIGHT: 189.15 LBS | OXYGEN SATURATION: 99 % | SYSTOLIC BLOOD PRESSURE: 135 MMHG | DIASTOLIC BLOOD PRESSURE: 69 MMHG

## 2024-12-07 DIAGNOSIS — S42.031A CLOSED DISPLACED FRACTURE OF ACROMIAL END OF RIGHT CLAVICLE, INITIAL ENCOUNTER: Primary | ICD-10-CM

## 2024-12-07 PROCEDURE — 73030 X-RAY EXAM OF SHOULDER: CPT

## 2024-12-07 PROCEDURE — 99283 EMERGENCY DEPT VISIT LOW MDM: CPT

## 2024-12-07 RX ORDER — TRAMADOL HYDROCHLORIDE 50 MG/1
50 TABLET ORAL EVERY 6 HOURS PRN
Qty: 12 TABLET | Refills: 0 | Status: SHIPPED | OUTPATIENT
Start: 2024-12-07 | End: 2024-12-10

## 2024-12-07 ASSESSMENT — PAIN - FUNCTIONAL ASSESSMENT
PAIN_FUNCTIONAL_ASSESSMENT: 0-10
PAIN_FUNCTIONAL_ASSESSMENT: NONE - DENIES PAIN

## 2024-12-07 ASSESSMENT — PAIN SCALES - GENERAL: PAINLEVEL_OUTOF10: 5

## 2024-12-07 ASSESSMENT — PAIN DESCRIPTION - ORIENTATION: ORIENTATION: RIGHT

## 2024-12-07 ASSESSMENT — LIFESTYLE VARIABLES
HOW MANY STANDARD DRINKS CONTAINING ALCOHOL DO YOU HAVE ON A TYPICAL DAY: PATIENT DOES NOT DRINK
HOW OFTEN DO YOU HAVE A DRINK CONTAINING ALCOHOL: NEVER

## 2024-12-07 ASSESSMENT — PAIN DESCRIPTION - LOCATION: LOCATION: SHOULDER

## 2024-12-07 NOTE — ED TRIAGE NOTES
Patient arrives ambulatory. States that 2 hours ago, he was playing golf and tripped and fell. Denies head injury or LOC. He reports pain to the right shoulder.

## 2024-12-08 NOTE — ED PROVIDER NOTES
1. Closed displaced fracture of acromial end of right clavicle, initial encounter          DISPOSITION/PLAN   DISPOSITION Decision To Discharge 12/07/2024 06:58:04 PM          (Please note that portions of this note were completed with a voice recognition program.  Efforts were made to edit the dictations but occasionally words are mis-transcribed.)    Michael Short DO (electronically signed)  Emergency Attending Physician               Michael Short DO  12/08/24 0705

## (undated) DEVICE — TRAP SURG QUAD PARABOLA SLOT DSGN SFTY SCRN TRAPEASE

## (undated) DEVICE — FORCEPS BX L240CM JAW DIA2.8MM L CAP W/ NDL MIC MESH TOOTH

## (undated) DEVICE — CANN NASAL O2 CAPNOGRAPHY AD -- FILTERLINE

## (undated) DEVICE — SYRINGE MED 20ML STD CLR PLAS LUERLOCK TIP N CTRL DISP

## (undated) DEVICE — QUILTED PREMIUM COMFORT UNDERPAD,EXTRA HEAVY: Brand: WINGS

## (undated) DEVICE — Device

## (undated) DEVICE — TUBING HYDR IRR --

## (undated) DEVICE — CONTAINER SPEC 20 ML LID NEUT BUFF FORMALIN 10 % POLYPR STS

## (undated) DEVICE — NEEDLE HYPO 18GA L1.5IN PNK S STL HUB POLYPR SHLD REG BVL

## (undated) DEVICE — KIT IV STRT W CHLORAPREP PD 1ML

## (undated) DEVICE — BAG BELONG PT PERS CLEAR HANDL

## (undated) DEVICE — CONNECTOR TBNG AUX H2O JET DISP FOR OLY 160/180 SER

## (undated) DEVICE — SOLIDIFIER FLUID 3000 CC ABSORB

## (undated) DEVICE — BW-412T DISP COMBO CLEANING BRUSH: Brand: SINGLE USE COMBINATION CLEANING BRUSH

## (undated) DEVICE — SNARE ENDOSCP M L240CM W27MM SHTH DIA2.4MM CHN 2.8MM OVL

## (undated) DEVICE — Z DISCONTINUED USE 2751540 TUBING IRRIG L10IN DISP PMP ENDOGATOR

## (undated) DEVICE — SET ADMIN 16ML TBNG L100IN 2 Y INJ SITE IV PIGGY BK DISP

## (undated) DEVICE — KENDALL RADIOLUCENT FOAM MONITORING ELECTRODE -RECTANGULAR SHAPE: Brand: KENDALL

## (undated) DEVICE — NEONATAL-ADULT SPO2 SENSOR: Brand: NELLCOR

## (undated) DEVICE — AIRLIFE™ U/CONNECT-IT OXYGEN TUBING 7 FEET (2.1 M) CRUSH-RESISTANT OXYGEN TUBING, VINYL TIPPED: Brand: AIRLIFE™

## (undated) DEVICE — REM POLYHESIVE ADULT PATIENT RETURN ELECTRODE: Brand: VALLEYLAB

## (undated) DEVICE — BAG SPEC BIOHZD LF 2MIL 6X10IN -- CONVERT TO ITEM 357326

## (undated) DEVICE — SET EXTN TBNG L BOR 4 W STPCOCK ST 32IN PRIMING VOL 6ML

## (undated) DEVICE — ENDO CARRY-ON PROCEDURE KIT INCLUDES ENZYMATIC SPONGE, GAUZE, BIOHAZARD LABEL, TRAY, LUBRICANT, DIRTY SCOPE LABEL, WATER LABEL, TRAY, DRAWSTRING PAD, AND DEFENDO 4-PIECE KIT.: Brand: ENDO CARRY-ON PROCEDURE KIT

## (undated) DEVICE — 1200 GUARD II KIT W/5MM TUBE W/O VAC TUBE: Brand: GUARDIAN

## (undated) DEVICE — CATH IV AUTOGRD BC BLU 22GA 25 -- INSYTE